# Patient Record
Sex: FEMALE | Race: WHITE | NOT HISPANIC OR LATINO | Employment: FULL TIME | URBAN - METROPOLITAN AREA
[De-identification: names, ages, dates, MRNs, and addresses within clinical notes are randomized per-mention and may not be internally consistent; named-entity substitution may affect disease eponyms.]

---

## 2021-04-27 ENCOUNTER — HOSPITAL ENCOUNTER (OUTPATIENT)
Dept: MRI IMAGING | Facility: HOSPITAL | Age: 46
Discharge: HOME | End: 2021-04-27
Attending: OBSTETRICS & GYNECOLOGY

## 2021-05-13 ENCOUNTER — HOSPITAL ENCOUNTER (OUTPATIENT)
Dept: ULTRASOUND IMAGING | Facility: HOSPITAL | Age: 46
Discharge: HOME | End: 2021-05-13
Attending: OBSTETRICS & GYNECOLOGY

## 2021-06-04 ENCOUNTER — HOSPITAL ENCOUNTER (OUTPATIENT)
Dept: ULTRASOUND IMAGING | Facility: HOSPITAL | Age: 46
Discharge: HOME | End: 2021-06-04
Attending: OBSTETRICS & GYNECOLOGY

## 2021-06-07 PROCEDURE — 88274 CYTOGENETICS 25-99: CPT | Performed by: PATHOLOGY

## 2021-06-07 PROCEDURE — 36415 COLL VENOUS BLD VENIPUNCTURE: CPT | Performed by: PATHOLOGY

## 2021-06-07 PROCEDURE — 88377 M/PHMTRC ALYS ISHQUANT/SEMIQ: CPT | Performed by: PATHOLOGY

## 2021-06-08 ENCOUNTER — TELEPHONE (OUTPATIENT)
Dept: MAMMOGRAPHY | Facility: HOSPITAL | Age: 46
End: 2021-06-08

## 2021-06-08 LAB — SPECIMEN STATUS: NORMAL

## 2021-06-08 NOTE — TELEPHONE ENCOUNTER
FROILAN CALLED TO INFORM ME THAT PT WILL BE SEEING DR. DARLENE FERNANDES IN [facility] FOR TREATMENT OF BREAST CANCER

## 2021-06-10 LAB
CELLS COUNTED: 40
CHROMOSOME 17 COPY NUMBER/NUCLEUS IN TISSUE BY FISH: 1.7
ERBB2 COPY NUM/CHR 17 COPY NUM TISS FISH: 1.19
ERBB2 GENE COPY NUMBER/NUCLEUS IN TISSUE BY FISH: 2
ERBB2 GENE DP TISS QL FISH: NORMAL
FIXATION TIME: NORMAL
FIXATIVE: NORMAL
LAB DIRECTOR NAME PROVIDER: NORMAL
NUMBER OF OBSERVERS:: 2
REF LAB TEST REF RANGE: NORMAL

## 2022-01-24 ENCOUNTER — OFFICE VISIT (OUTPATIENT)
Dept: OBSTETRICS AND GYNECOLOGY | Facility: CLINIC | Age: 47
End: 2022-01-24

## 2022-01-24 VITALS
HEART RATE: 81 BPM | HEIGHT: 63 IN | BODY MASS INDEX: 24.7 KG/M2 | SYSTOLIC BLOOD PRESSURE: 115 MMHG | DIASTOLIC BLOOD PRESSURE: 70 MMHG | WEIGHT: 139.4 LBS

## 2022-01-24 DIAGNOSIS — T45.1X5A HOT FLASHES DUE TO TAMOXIFEN: ICD-10-CM

## 2022-01-24 DIAGNOSIS — Z01.419 WELL WOMAN EXAM WITH ROUTINE GYNECOLOGICAL EXAM: Primary | ICD-10-CM

## 2022-01-24 DIAGNOSIS — R23.2 HOT FLASHES DUE TO TAMOXIFEN: ICD-10-CM

## 2022-01-24 DIAGNOSIS — Z12.11 ENCOUNTER FOR SCREENING COLONOSCOPY: ICD-10-CM

## 2022-01-24 DIAGNOSIS — Z85.3 HISTORY OF BREAST CANCER: ICD-10-CM

## 2022-01-24 DIAGNOSIS — N92.0 MENORRHAGIA WITH REGULAR CYCLE: ICD-10-CM

## 2022-01-24 DIAGNOSIS — N89.8 VAGINAL DRYNESS: ICD-10-CM

## 2022-01-24 PROCEDURE — 99214 OFFICE O/P EST MOD 30 MIN: CPT | Performed by: OBSTETRICS & GYNECOLOGY

## 2022-01-24 PROCEDURE — 99396 PREV VISIT EST AGE 40-64: CPT | Performed by: OBSTETRICS & GYNECOLOGY

## 2022-01-24 PROCEDURE — G0123 SCREEN CERV/VAG THIN LAYER: HCPCS | Performed by: OBSTETRICS & GYNECOLOGY

## 2022-01-24 RX ORDER — TAMOXIFEN CITRATE 20 MG/1
20 TABLET ORAL DAILY
COMMUNITY
Start: 2021-12-29 | End: 2022-03-29

## 2022-01-24 RX ORDER — ASPIRIN 81 MG/1
81 TABLET, CHEWABLE ORAL DAILY
COMMUNITY
End: 2022-03-29

## 2022-01-24 NOTE — PROGRESS NOTES
Well Woman Visit    Chief Complaint   Patient presents with   • Annual Exam     discuss hormone blocker, heavier cycles, painful intercourse           HPI  Shruthi Gabriel is a 46 y.o. female,  who presents for annual well woman exam.LMP 21 Pt is .   Pt is no GYN surgery  Menses q 28 days  x 3-4 days with 2 days heavy. On days you consider heavy use overnight pads or super + tampons and change  q1-2 hours. Do you ever bleed through pad or tampon in 2 hours or less? yew. Cramping with periods no..Patient is having symptoms of urinary incontinence No. Pt diagnosed with breast cancer hormone receptor positive last year.  Pt is currently on tamoxifen.  She is interested in management of the menorrhagia.    Additional OB/GYN History   Current contraception: contraceptive methods: Vasectomy   Desires to: continue contraception  Last Pap :   Last Completed Pap Smear          Ordered - PAP SMEAR (Every 3 Years) Ordered on 2021  SCANNED - PAP SMEAR              Result: Normal  History of abnormal Pap smear: no  Last MMG :   Last Completed Mammogram          MAMMOGRAM (Yearly) Next due on 2021  MRI Breast Bilateral With & Without Contrast    03/10/2021  SCANNED - MAMMO               Last Colonoscopy :   Last Completed Colonoscopy     This patient has no relevant Health Maintenance data.            AllergiesPatient has no known allergies.   Family history of uterine, colon, breast, or ovarian cancer: yes - sister and self:breast  Tobacco Usage?: No   OB History        1    Para   1    Term   1            AB        Living   1       SAB        IAB        Ectopic        Molar        Multiple        Live Births   1                The additional following portions of the patient's history were reviewed and updated as appropriate: allergies, current  "medications, past family history, past medical history, past social history, past surgical history and problem list.    Review of Systems   Constitutional: Negative.    HENT: Negative.    Eyes: Negative.    Respiratory: Negative.    Cardiovascular: Negative.    Gastrointestinal: Negative.    Endocrine: Negative.    Genitourinary: Negative.    Musculoskeletal: Negative.    Skin: Negative.    Allergic/Immunologic: Negative.    Neurological: Negative.    Hematological: Negative.    Psychiatric/Behavioral: Negative.        I have reviewed and agree with the HPI, ROS, and historical information as entered above. Magda Jean, DO    Objective   /70   Pulse 81   Ht 160 cm (63\")   Wt 63.2 kg (139 lb 6.4 oz)   LMP 12/27/2021   BMI 24.69 kg/m²     Physical Exam  Vitals and nursing note reviewed. Exam conducted with a chaperone present.   Constitutional:       Appearance: Normal appearance.   HENT:      Head: Normocephalic.   Neck:      Thyroid: No thyroid mass or thyromegaly.   Cardiovascular:      Rate and Rhythm: Regular rhythm.      Heart sounds: Normal heart sounds. No murmur heard.      Pulmonary:      Effort: Pulmonary effort is normal.      Breath sounds: Normal breath sounds. No wheezing.   Chest:   Breasts:      Right: Normal. No swelling, bleeding, inverted nipple, mass, nipple discharge, skin change, tenderness, axillary adenopathy or supraclavicular adenopathy.      Left: Normal. No swelling, bleeding, inverted nipple, mass, nipple discharge, skin change, tenderness, axillary adenopathy or supraclavicular adenopathy.       Abdominal:      General: There is no distension.      Palpations: Abdomen is soft. There is no mass.      Tenderness: There is no abdominal tenderness. There is no guarding or rebound.      Hernia: No hernia is present.   Genitourinary:     General: Normal vulva.      Labia:         Right: No lesion.         Left: No lesion.       Urethra: No urethral pain or urethral lesion.      " Vagina: Normal. No vaginal discharge, tenderness or prolapsed vaginal walls.      Cervix: Normal.      Uterus: Normal.       Adnexa: Right adnexa normal and left adnexa normal.      Rectum: Normal. Guaiac result negative.      Comments: Nml female external genitalia.  Cervix is parous. Uterus axial normal size shape and consistency.  No adnexal masses are appreciated    Musculoskeletal:      Cervical back: Normal range of motion and neck supple. No tenderness.   Lymphadenopathy:      Upper Body:      Right upper body: No supraclavicular or axillary adenopathy.      Left upper body: No supraclavicular or axillary adenopathy.   Skin:     General: Skin is warm and dry.   Neurological:      Mental Status: She is alert and oriented to person, place, and time.   Psychiatric:         Mood and Affect: Mood normal.         Behavior: Behavior normal.         Thought Content: Thought content normal.            Assessment and Plan    WWE    Diagnoses and all orders for this visit:    1. Well woman exam with routine gynecological exam (Primary)  -     IGP,rfx Aptima HPV All Pth    2. Menorrhagia with regular cycle  Overview:  Added automatically from request for surgery [id number]    Orders:  -     US Pelvis Transvaginal Non OB; Future  -     Case Request; Standing  -     COVID PRE-OP / PRE-PROCEDURE SCREENING ORDER (NO ISOLATION) - Swab, Nasopharynx; Future  -     Case Request    3. Encounter for screening colonoscopy  -     Ambulatory Referral to Gastroenterology    4. History of breast cancer   Patient reports breast cancer is estrogen and progesterone receptor positive.  She is therefore not a candidate for the use of hormonal manipulation to manage her menorrhagia.  Due to this I have recommended that we proceed with hysteroscopy D&C endometrial ablation to help her with her bleeding.  5.  Hot flashes due to tamoxifen   I discussed with the patient the possible use of Effexor for her hot flashes and I have recommended to the  patient she discussed this with her oncologist to see if he has any reservations with the use of this medication.  6.  Vaginal dryness I believe that the vaginal dryness and dyspareunia are also secondary to the use of her tamoxifen.  Since we are limited to medical treatment as we must avoid hormonal manipulation, I recommend pomegranate seed oil and have asked the patient to also speak to her oncologist about the use of Vagifem which has not been shown to have any systemic absorption.  If her oncologist is okay with the use of the Vagifem and/or Effexor she can let me know if he is uncomfortable prescribing these and I am happy to do that      Counselin. Breast Health- Clinical breast exam and mammogram reviewed specifically American Cancer Society recommendations for screening specifically to her, and self  breast awareness monthly  2. Pap updated today Yes  3. Smoking status - Non smoker  4. Colon health - screening  colonoscopy as per American Cancer Society recommendations.  5. Bone health - Weight bearing exercise, dietary calcium recommendations and vitamin D reviewed  6. Encouraged to be wary of information obtained via social media and internet based on source and search  7. Follow up prn and one year  8. Eat a diet that includes plenty of vegetables, fruits, low-fat dairy products, and lean protein  9. Do not eat a lot of foods that are high in solid fats, added sugars, or sodium  10. Maintain healthy weight  11. Get regular exercise: at least 150 minutes each week. The exercise should increase your heart rate and make you sweat (moderate-intensity exercise).  12. Do strengthening exercises at least 2x per week. This in addition to moderate-intensity exercise.  13. Spend less time sitting.  Even light physical activity can be beneficial.  14. Watch cholesterol and blood lipids     Preventative:  • MMG  • Colonoscopy          She understands the importance of having the above performed in a timely  fashion.  The risks of not performing them include, but are not limited to, advanced cancer stages, bone loss from osteoporosis and/or subsequent increase in morbidity and/or mortality.  She is encouraged to review her results online and/or contact or office if she has questions.     Follow Up:  No follow-ups on file.        Magda Jean,   01/24/2022

## 2022-01-24 NOTE — PROGRESS NOTES
Well Woman Visit    CC: Scheduled annual well gyn visit  Chief Complaint   Patient presents with   • Annual Exam       Myriad intake in the past?: Yes    QUALIFIED TODAY    Contraception or HRT: None

## 2022-01-26 LAB
CONV .: NORMAL
CYTOLOGIST CVX/VAG CYTO: NORMAL
CYTOLOGY CVX/VAG DOC CYTO: NORMAL
CYTOLOGY CVX/VAG DOC THIN PREP: NORMAL
DX ICD CODE: NORMAL
HIV 1 & 2 AB SER-IMP: NORMAL
OTHER STN SPEC: NORMAL
STAT OF ADQ CVX/VAG CYTO-IMP: NORMAL

## 2022-02-17 ENCOUNTER — TELEPHONE (OUTPATIENT)
Dept: GASTROENTEROLOGY | Facility: CLINIC | Age: 47
End: 2022-02-17

## 2022-02-17 ENCOUNTER — PREP FOR SURGERY (OUTPATIENT)
Dept: OTHER | Facility: HOSPITAL | Age: 47
End: 2022-02-17

## 2022-02-17 ENCOUNTER — CLINICAL SUPPORT (OUTPATIENT)
Dept: GASTROENTEROLOGY | Facility: CLINIC | Age: 47
End: 2022-02-17

## 2022-02-17 ENCOUNTER — LAB (OUTPATIENT)
Dept: LAB | Facility: HOSPITAL | Age: 47
End: 2022-02-17

## 2022-02-17 DIAGNOSIS — Z12.11 ENCOUNTER FOR SCREENING FOR MALIGNANT NEOPLASM OF COLON: Primary | ICD-10-CM

## 2022-02-17 DIAGNOSIS — N92.0 MENORRHAGIA WITH REGULAR CYCLE: ICD-10-CM

## 2022-02-17 LAB — SARS-COV-2 RNA PNL SPEC NAA+PROBE: NOT DETECTED

## 2022-02-17 PROCEDURE — C9803 HOPD COVID-19 SPEC COLLECT: HCPCS

## 2022-02-17 PROCEDURE — U0004 COV-19 TEST NON-CDC HGH THRU: HCPCS

## 2022-02-17 RX ORDER — SOD SULF/POT CHLORIDE/MAG SULF 1.479 G
12 TABLET ORAL TAKE AS DIRECTED
Qty: 24 TABLET | Refills: 0 | Status: SHIPPED | OUTPATIENT
Start: 2022-02-17 | End: 2022-04-01

## 2022-02-17 NOTE — TELEPHONE ENCOUNTER
Shruthi Gabriel  REASON FOR CALL Colon Screening  SENT IN PREP [facility]  Past Medical History:   Diagnosis Date   • Breast cancer (HCC)    • Menorrhagia    • Ulcerative colitis (HCC)      No Known Allergies  Past Surgical History:   Procedure Laterality Date   • BREAST LUMPECTOMY     •  SECTION     • COLONOSCOPY       Social History     Socioeconomic History   • Marital status:    Tobacco Use   • Smoking status: Never Smoker   • Smokeless tobacco: Never Used   Vaping Use   • Vaping Use: Never used   Substance and Sexual Activity   • Alcohol use: Not Currently   • Drug use: Never   • Sexual activity: Not Currently     Birth control/protection: Partner's vasectomy     Family History   Problem Relation Age of Onset   • Breast cancer Sister    • Colon cancer Neg Hx        Current Outpatient Medications:   •  aspirin 81 MG chewable tablet, Chew 81 mg Daily. Per pt was instructed to stop 2 weeks prior to procedure, Disp: , Rfl:   •  tamoxifen (NOLVADEX) 20 MG chemo tablet, Take 20 mg by mouth Daily., Disp: , Rfl:

## 2022-02-20 PROCEDURE — S0260 H&P FOR SURGERY: HCPCS | Performed by: OBSTETRICS & GYNECOLOGY

## 2022-02-20 RX ORDER — CEFAZOLIN SODIUM 2 G/100ML
2 INJECTION, SOLUTION INTRAVENOUS ONCE
Status: COMPLETED | OUTPATIENT
Start: 2022-02-20 | End: 2022-02-22

## 2022-02-21 PROBLEM — Z12.11 ENCOUNTER FOR SCREENING FOR MALIGNANT NEOPLASM OF COLON: Status: ACTIVE | Noted: 2022-02-21

## 2022-02-22 ENCOUNTER — ANESTHESIA (OUTPATIENT)
Dept: PERIOP | Facility: HOSPITAL | Age: 47
End: 2022-02-22

## 2022-02-22 ENCOUNTER — ANESTHESIA EVENT (OUTPATIENT)
Dept: PERIOP | Facility: HOSPITAL | Age: 47
End: 2022-02-22

## 2022-02-22 ENCOUNTER — HOSPITAL ENCOUNTER (OUTPATIENT)
Facility: HOSPITAL | Age: 47
Setting detail: HOSPITAL OUTPATIENT SURGERY
Discharge: HOME | End: 2022-02-22
Attending: OBSTETRICS & GYNECOLOGY | Admitting: OBSTETRICS & GYNECOLOGY

## 2022-02-22 VITALS
HEIGHT: 63 IN | SYSTOLIC BLOOD PRESSURE: 122 MMHG | RESPIRATION RATE: 16 BRPM | HEART RATE: 67 BPM | OXYGEN SATURATION: 100 % | WEIGHT: 137.35 LBS | BODY MASS INDEX: 24.34 KG/M2 | DIASTOLIC BLOOD PRESSURE: 69 MMHG | TEMPERATURE: 96.6 F

## 2022-02-22 DIAGNOSIS — N92.0 MENORRHAGIA WITH REGULAR CYCLE: ICD-10-CM

## 2022-02-22 PROBLEM — Z85.3 HISTORY OF BREAST CANCER: Status: ACTIVE | Noted: 2022-02-22

## 2022-02-22 LAB
BASOPHILS # BLD AUTO: 0.04 10*3/MM3 (ref 0–0.2)
BASOPHILS NFR BLD AUTO: 0.7 % (ref 0–1.5)
DEPRECATED RDW RBC AUTO: 44.5 FL (ref 37–54)
EOSINOPHIL # BLD AUTO: 0.14 10*3/MM3 (ref 0–0.4)
EOSINOPHIL NFR BLD AUTO: 2.5 % (ref 0.3–6.2)
ERYTHROCYTE [DISTWIDTH] IN BLOOD BY AUTOMATED COUNT: 14.2 % (ref 12.3–15.4)
HCG SERPL QL: NEGATIVE
HCT VFR BLD AUTO: 35.4 % (ref 34–46.6)
HGB BLD-MCNC: 10.9 G/DL (ref 12–15.9)
IMM GRANULOCYTES # BLD AUTO: 0.02 10*3/MM3 (ref 0–0.05)
IMM GRANULOCYTES NFR BLD AUTO: 0.4 % (ref 0–0.5)
LYMPHOCYTES # BLD AUTO: 0.96 10*3/MM3 (ref 0.7–3.1)
LYMPHOCYTES NFR BLD AUTO: 17.1 % (ref 19.6–45.3)
MCH RBC QN AUTO: 27 PG (ref 26.6–33)
MCHC RBC AUTO-ENTMCNC: 30.8 G/DL (ref 31.5–35.7)
MCV RBC AUTO: 87.6 FL (ref 79–97)
MONOCYTES # BLD AUTO: 0.44 10*3/MM3 (ref 0.1–0.9)
MONOCYTES NFR BLD AUTO: 7.9 % (ref 5–12)
NEUTROPHILS NFR BLD AUTO: 4 10*3/MM3 (ref 1.7–7)
NEUTROPHILS NFR BLD AUTO: 71.4 % (ref 42.7–76)
NRBC BLD AUTO-RTO: 0 /100 WBC (ref 0–0.2)
PLATELET # BLD AUTO: 266 10*3/MM3 (ref 140–450)
PMV BLD AUTO: 10.5 FL (ref 6–12)
RBC # BLD AUTO: 4.04 10*6/MM3 (ref 3.77–5.28)
WBC NRBC COR # BLD: 5.6 10*3/MM3 (ref 3.4–10.8)

## 2022-02-22 PROCEDURE — 25010000002 MIDAZOLAM PER 1 MG: Performed by: ANESTHESIOLOGY

## 2022-02-22 PROCEDURE — 85025 COMPLETE CBC W/AUTO DIFF WBC: CPT | Performed by: OBSTETRICS & GYNECOLOGY

## 2022-02-22 PROCEDURE — 25010000002 ONDANSETRON PER 1 MG: Performed by: NURSE ANESTHETIST, CERTIFIED REGISTERED

## 2022-02-22 PROCEDURE — 25010000002 PROPOFOL 10 MG/ML EMULSION: Performed by: NURSE ANESTHETIST, CERTIFIED REGISTERED

## 2022-02-22 PROCEDURE — 0 CEFAZOLIN IN DEXTROSE 2-4 GM/100ML-% SOLUTION: Performed by: OBSTETRICS & GYNECOLOGY

## 2022-02-22 PROCEDURE — 88305 TISSUE EXAM BY PATHOLOGIST: CPT | Performed by: OBSTETRICS & GYNECOLOGY

## 2022-02-22 PROCEDURE — 84703 CHORIONIC GONADOTROPIN ASSAY: CPT | Performed by: OBSTETRICS & GYNECOLOGY

## 2022-02-22 PROCEDURE — 58563 HYSTEROSCOPY ABLATION: CPT | Performed by: OBSTETRICS & GYNECOLOGY

## 2022-02-22 PROCEDURE — 25010000002 FENTANYL CITRATE (PF) 50 MCG/ML SOLUTION: Performed by: NURSE ANESTHETIST, CERTIFIED REGISTERED

## 2022-02-22 PROCEDURE — 0 LIDOCAINE 1 % SOLUTION: Performed by: OBSTETRICS & GYNECOLOGY

## 2022-02-22 PROCEDURE — 25010000002 KETOROLAC TROMETHAMINE PER 15 MG: Performed by: NURSE ANESTHETIST, CERTIFIED REGISTERED

## 2022-02-22 RX ORDER — MEPERIDINE HYDROCHLORIDE 25 MG/ML
12.5 INJECTION INTRAMUSCULAR; INTRAVENOUS; SUBCUTANEOUS
Status: DISCONTINUED | OUTPATIENT
Start: 2022-02-22 | End: 2022-02-22 | Stop reason: HOSPADM

## 2022-02-22 RX ORDER — SODIUM CHLORIDE, SODIUM LACTATE, POTASSIUM CHLORIDE, CALCIUM CHLORIDE 600; 310; 30; 20 MG/100ML; MG/100ML; MG/100ML; MG/100ML
9 INJECTION, SOLUTION INTRAVENOUS CONTINUOUS PRN
Status: DISCONTINUED | OUTPATIENT
Start: 2022-02-22 | End: 2022-02-22 | Stop reason: HOSPADM

## 2022-02-22 RX ORDER — FENTANYL CITRATE 50 UG/ML
INJECTION, SOLUTION INTRAMUSCULAR; INTRAVENOUS AS NEEDED
Status: DISCONTINUED | OUTPATIENT
Start: 2022-02-22 | End: 2022-02-22 | Stop reason: SURG

## 2022-02-22 RX ORDER — KETOROLAC TROMETHAMINE 30 MG/ML
INJECTION, SOLUTION INTRAMUSCULAR; INTRAVENOUS AS NEEDED
Status: DISCONTINUED | OUTPATIENT
Start: 2022-02-22 | End: 2022-02-22 | Stop reason: SURG

## 2022-02-22 RX ORDER — ACETAMINOPHEN 500 MG
1000 TABLET ORAL ONCE
Status: COMPLETED | OUTPATIENT
Start: 2022-02-22 | End: 2022-02-22

## 2022-02-22 RX ORDER — OXYCODONE HYDROCHLORIDE 5 MG/1
5 TABLET ORAL
Status: DISCONTINUED | OUTPATIENT
Start: 2022-02-22 | End: 2022-02-22 | Stop reason: HOSPADM

## 2022-02-22 RX ORDER — IBUPROFEN 600 MG/1
600 TABLET ORAL EVERY 6 HOURS PRN
Status: DISCONTINUED | OUTPATIENT
Start: 2022-02-22 | End: 2022-02-22 | Stop reason: HOSPADM

## 2022-02-22 RX ORDER — ONDANSETRON 4 MG/1
4 TABLET, FILM COATED ORAL ONCE AS NEEDED
Status: DISCONTINUED | OUTPATIENT
Start: 2022-02-22 | End: 2022-02-22 | Stop reason: HOSPADM

## 2022-02-22 RX ORDER — ONDANSETRON 2 MG/ML
INJECTION INTRAMUSCULAR; INTRAVENOUS AS NEEDED
Status: DISCONTINUED | OUTPATIENT
Start: 2022-02-22 | End: 2022-02-22 | Stop reason: SURG

## 2022-02-22 RX ORDER — ONDANSETRON 2 MG/ML
4 INJECTION INTRAMUSCULAR; INTRAVENOUS ONCE AS NEEDED
Status: DISCONTINUED | OUTPATIENT
Start: 2022-02-22 | End: 2022-02-22 | Stop reason: HOSPADM

## 2022-02-22 RX ORDER — MIDAZOLAM HYDROCHLORIDE 1 MG/ML
2 INJECTION INTRAMUSCULAR; INTRAVENOUS ONCE
Status: COMPLETED | OUTPATIENT
Start: 2022-02-22 | End: 2022-02-22

## 2022-02-22 RX ORDER — IBUPROFEN 600 MG/1
600 TABLET ORAL EVERY 6 HOURS PRN
Qty: 30 TABLET | Refills: 1 | Status: SHIPPED | OUTPATIENT
Start: 2022-02-22 | End: 2022-04-01

## 2022-02-22 RX ORDER — LIDOCAINE HYDROCHLORIDE 20 MG/ML
INJECTION, SOLUTION INFILTRATION; PERINEURAL AS NEEDED
Status: DISCONTINUED | OUTPATIENT
Start: 2022-02-22 | End: 2022-02-22 | Stop reason: SURG

## 2022-02-22 RX ORDER — PROMETHAZINE HYDROCHLORIDE 25 MG/1
25 SUPPOSITORY RECTAL ONCE AS NEEDED
Status: DISCONTINUED | OUTPATIENT
Start: 2022-02-22 | End: 2022-02-22 | Stop reason: HOSPADM

## 2022-02-22 RX ORDER — PROMETHAZINE HYDROCHLORIDE 12.5 MG/1
25 TABLET ORAL ONCE AS NEEDED
Status: DISCONTINUED | OUTPATIENT
Start: 2022-02-22 | End: 2022-02-22 | Stop reason: HOSPADM

## 2022-02-22 RX ORDER — HYDROCODONE BITARTRATE AND ACETAMINOPHEN 5; 325 MG/1; MG/1
1-2 TABLET ORAL EVERY 4 HOURS PRN
Qty: 12 TABLET | Refills: 0 | Status: SHIPPED | OUTPATIENT
Start: 2022-02-22 | End: 2022-04-01

## 2022-02-22 RX ORDER — PROPOFOL 10 MG/ML
VIAL (ML) INTRAVENOUS AS NEEDED
Status: DISCONTINUED | OUTPATIENT
Start: 2022-02-22 | End: 2022-02-22 | Stop reason: SURG

## 2022-02-22 RX ORDER — MAGNESIUM HYDROXIDE 1200 MG/15ML
LIQUID ORAL AS NEEDED
Status: DISCONTINUED | OUTPATIENT
Start: 2022-02-22 | End: 2022-02-22 | Stop reason: HOSPADM

## 2022-02-22 RX ORDER — LIDOCAINE HYDROCHLORIDE 10 MG/ML
INJECTION, SOLUTION INFILTRATION; PERINEURAL AS NEEDED
Status: DISCONTINUED | OUTPATIENT
Start: 2022-02-22 | End: 2022-02-22 | Stop reason: HOSPADM

## 2022-02-22 RX ADMIN — ACETAMINOPHEN 1000 MG: 500 TABLET ORAL at 10:19

## 2022-02-22 RX ADMIN — PROPOFOL 220 MCG/KG/MIN: 10 INJECTION, EMULSION INTRAVENOUS at 12:20

## 2022-02-22 RX ADMIN — FENTANYL CITRATE 25 MCG: 50 INJECTION, SOLUTION INTRAMUSCULAR; INTRAVENOUS at 12:35

## 2022-02-22 RX ADMIN — FENTANYL CITRATE 50 MCG: 50 INJECTION, SOLUTION INTRAMUSCULAR; INTRAVENOUS at 12:18

## 2022-02-22 RX ADMIN — CEFAZOLIN SODIUM 2 G: 2 INJECTION, SOLUTION INTRAVENOUS at 12:16

## 2022-02-22 RX ADMIN — KETOROLAC TROMETHAMINE 30 MG: 30 INJECTION, SOLUTION INTRAMUSCULAR; INTRAVENOUS at 12:33

## 2022-02-22 RX ADMIN — MIDAZOLAM 2 MG: 1 INJECTION INTRAMUSCULAR; INTRAVENOUS at 11:53

## 2022-02-22 RX ADMIN — LIDOCAINE HYDROCHLORIDE 60 MG: 20 INJECTION, SOLUTION INFILTRATION; PERINEURAL at 12:18

## 2022-02-22 RX ADMIN — FENTANYL CITRATE 25 MCG: 50 INJECTION, SOLUTION INTRAMUSCULAR; INTRAVENOUS at 12:28

## 2022-02-22 RX ADMIN — ONDANSETRON 4 MG: 2 INJECTION INTRAMUSCULAR; INTRAVENOUS at 12:33

## 2022-02-22 RX ADMIN — SODIUM CHLORIDE, POTASSIUM CHLORIDE, SODIUM LACTATE AND CALCIUM CHLORIDE 9 ML/HR: 600; 310; 30; 20 INJECTION, SOLUTION INTRAVENOUS at 10:19

## 2022-02-22 RX ADMIN — PROPOFOL 70 MG: 10 INJECTION, EMULSION INTRAVENOUS at 12:18

## 2022-02-22 RX ADMIN — SODIUM CHLORIDE, POTASSIUM CHLORIDE, SODIUM LACTATE AND CALCIUM CHLORIDE 9 ML/HR: 600; 310; 30; 20 INJECTION, SOLUTION INTRAVENOUS at 13:38

## 2022-02-22 NOTE — ANESTHESIA POSTPROCEDURE EVALUATION
Patient: Shruthi Gabriel    Procedure Summary     Date: 02/22/22 Room / Location: [facility] OR 04 / [facility] MAIN OR    Anesthesia Start: 1216 Anesthesia Stop: 1304    Procedure: DILATATION AND CURETTAGE HYSTEROSCOPY NOVASURE ENDOMETRIAL ABLATION (N/A Vagina) Diagnosis:       Menorrhagia with regular cycle      (Menorrhagia with regular cycle [N92.0])    Surgeons: Magda Jean DO Provider: Emerson Bunch MD    Anesthesia Type: general ASA Status: 1          Anesthesia Type: general    Vitals  Vitals Value Taken Time   /66 02/22/22 1322   Temp 36.2 °C (97.2 °F) 02/22/22 1303   Pulse 52 02/22/22 1324   Resp 12 02/22/22 1310   SpO2 100 % 02/22/22 1324   Vitals shown include unvalidated device data.        Post Anesthesia Care and Evaluation    Patient location during evaluation: bedside  Patient participation: complete - patient participated  Level of consciousness: awake  Pain management: adequate  Airway patency: patent  Anesthetic complications: No anesthetic complications  PONV Status: none  Cardiovascular status: acceptable and stable  Respiratory status: acceptable and room air  Hydration status: acceptable    Comments: An Anesthesiologist personally participated in the most demanding procedures (including induction and emergence if applicable) in the anesthesia plan, monitored the course of anesthesia administration at frequent intervals and remained physically present and available for immediate diagnosis and treatment of emergencies.

## 2022-02-23 LAB
CYTO UR: NORMAL
LAB AP CASE REPORT: NORMAL
LAB AP CLINICAL INFORMATION: NORMAL
PATH REPORT.FINAL DX SPEC: NORMAL
PATH REPORT.GROSS SPEC: NORMAL

## 2022-03-29 RX ORDER — FERROUS SULFATE 325(65) MG
325 TABLET ORAL EVERY OTHER DAY
COMMUNITY

## 2022-03-31 ENCOUNTER — ANESTHESIA (OUTPATIENT)
Dept: GASTROENTEROLOGY | Facility: HOSPITAL | Age: 47
End: 2022-03-31

## 2022-03-31 ENCOUNTER — HOSPITAL ENCOUNTER (OUTPATIENT)
Facility: HOSPITAL | Age: 47
Setting detail: HOSPITAL OUTPATIENT SURGERY
Discharge: HOME | End: 2022-03-31
Attending: INTERNAL MEDICINE | Admitting: INTERNAL MEDICINE

## 2022-03-31 ENCOUNTER — ANESTHESIA EVENT (OUTPATIENT)
Dept: GASTROENTEROLOGY | Facility: HOSPITAL | Age: 47
End: 2022-03-31

## 2022-03-31 VITALS
OXYGEN SATURATION: 100 % | DIASTOLIC BLOOD PRESSURE: 58 MMHG | WEIGHT: 134.48 LBS | RESPIRATION RATE: 18 BRPM | HEIGHT: 63 IN | BODY MASS INDEX: 23.83 KG/M2 | SYSTOLIC BLOOD PRESSURE: 113 MMHG | HEART RATE: 74 BPM | TEMPERATURE: 99.2 F

## 2022-03-31 DIAGNOSIS — Z12.11 ENCOUNTER FOR SCREENING FOR MALIGNANT NEOPLASM OF COLON: ICD-10-CM

## 2022-03-31 LAB — B-HCG UR QL: NEGATIVE

## 2022-03-31 PROCEDURE — 81025 URINE PREGNANCY TEST: CPT | Performed by: INTERNAL MEDICINE

## 2022-03-31 PROCEDURE — 45399 UNLISTED PROCEDURE COLON: CPT | Performed by: INTERNAL MEDICINE

## 2022-03-31 PROCEDURE — 25010000002 PROPOFOL 10 MG/ML EMULSION: Performed by: NURSE ANESTHETIST, CERTIFIED REGISTERED

## 2022-03-31 PROCEDURE — 45385 COLONOSCOPY W/LESION REMOVAL: CPT | Performed by: INTERNAL MEDICINE

## 2022-03-31 PROCEDURE — 45380 COLONOSCOPY AND BIOPSY: CPT | Performed by: INTERNAL MEDICINE

## 2022-03-31 PROCEDURE — 88305 TISSUE EXAM BY PATHOLOGIST: CPT | Performed by: INTERNAL MEDICINE

## 2022-03-31 RX ORDER — PHENYLEPHRINE HCL IN 0.9% NACL 1 MG/10 ML
SYRINGE (ML) INTRAVENOUS AS NEEDED
Status: DISCONTINUED | OUTPATIENT
Start: 2022-03-31 | End: 2022-03-31 | Stop reason: SURG

## 2022-03-31 RX ORDER — LIDOCAINE HYDROCHLORIDE 20 MG/ML
INJECTION, SOLUTION INFILTRATION; PERINEURAL AS NEEDED
Status: DISCONTINUED | OUTPATIENT
Start: 2022-03-31 | End: 2022-03-31 | Stop reason: SURG

## 2022-03-31 RX ORDER — SODIUM CHLORIDE, SODIUM LACTATE, POTASSIUM CHLORIDE, CALCIUM CHLORIDE 600; 310; 30; 20 MG/100ML; MG/100ML; MG/100ML; MG/100ML
30 INJECTION, SOLUTION INTRAVENOUS CONTINUOUS
Status: DISCONTINUED | OUTPATIENT
Start: 2022-03-31 | End: 2022-03-31 | Stop reason: HOSPADM

## 2022-03-31 RX ORDER — PROPOFOL 10 MG/ML
VIAL (ML) INTRAVENOUS AS NEEDED
Status: DISCONTINUED | OUTPATIENT
Start: 2022-03-31 | End: 2022-03-31 | Stop reason: SURG

## 2022-03-31 RX ORDER — GLYCOPYRROLATE 0.2 MG/ML
INJECTION INTRAMUSCULAR; INTRAVENOUS AS NEEDED
Status: DISCONTINUED | OUTPATIENT
Start: 2022-03-31 | End: 2022-03-31 | Stop reason: SURG

## 2022-03-31 RX ADMIN — PROPOFOL 100 MG: 10 INJECTION, EMULSION INTRAVENOUS at 10:23

## 2022-03-31 RX ADMIN — SODIUM CHLORIDE, POTASSIUM CHLORIDE, SODIUM LACTATE AND CALCIUM CHLORIDE: 600; 310; 30; 20 INJECTION, SOLUTION INTRAVENOUS at 10:21

## 2022-03-31 RX ADMIN — GLYCOPYRROLATE 0.2 MCG: 0.2 INJECTION INTRAMUSCULAR; INTRAVENOUS at 10:39

## 2022-03-31 RX ADMIN — LIDOCAINE HYDROCHLORIDE 100 MG: 20 INJECTION, SOLUTION INFILTRATION; PERINEURAL at 10:23

## 2022-03-31 RX ADMIN — PROPOFOL 200 MCG/KG/MIN: 10 INJECTION, EMULSION INTRAVENOUS at 10:23

## 2022-03-31 RX ADMIN — Medication 100 MCG: at 10:36

## 2022-03-31 NOTE — ANESTHESIA PREPROCEDURE EVALUATION
Anesthesia Evaluation     Patient summary reviewed and Nursing notes reviewed   no history of anesthetic complications:  NPO Solid Status: > 8 hours  NPO Liquid Status: > 2 hours           Airway   Mallampati: II  TM distance: >3 FB  Neck ROM: full  No difficulty expected  Dental      Pulmonary - negative pulmonary ROS and normal exam    breath sounds clear to auscultation  Cardiovascular - negative cardio ROS and normal exam  Exercise tolerance: good (4-7 METS)    Rhythm: regular  Rate: normal        Neuro/Psych- negative ROS  GI/Hepatic/Renal/Endo - negative ROS     ROS Comment: UC    Musculoskeletal     Abdominal    Substance History      OB/GYN          Other      history of cancer remission                    Anesthesia Plan    ASA 1     general   total IV anesthesia    Anesthetic plan, all risks, benefits, and alternatives have been provided, discussed and informed consent has been obtained with: patient.        CODE STATUS:

## 2022-03-31 NOTE — ANESTHESIA POSTPROCEDURE EVALUATION
Patient: Shruthi Gabriel    Procedure Summary     Date: 03/31/22 Room / Location: [facility] ENDOSCOPY 4 / [facility]OSCOPY    Anesthesia Start: 1021 Anesthesia Stop: 1049    Procedure: COLONOSCOPY SCREENING WITH INDIGO CARMINE FLUSH, POLYPECTOMY (N/A ) Diagnosis:       Encounter for screening for malignant neoplasm of colon      (Encounter for screening for malignant neoplasm of colon [Z12.11])    Surgeons: Iggy Hoskins MD Provider: Marcus Kohli MD    Anesthesia Type: general ASA Status: 1          Anesthesia Type: general    Vitals  Vitals Value Taken Time   /58 03/31/22 1114   Temp 35.8 °C (96.4 °F) 03/31/22 1054   Pulse 73 03/31/22 1114   Resp 18 03/31/22 1110   SpO2 100 % 03/31/22 1114   Vitals shown include unvalidated device data.        Post Anesthesia Care and Evaluation    Patient location during evaluation: bedside  Patient participation: complete - patient participated  Level of consciousness: awake  Pain management: adequate  Airway patency: patent  Anesthetic complications: No anesthetic complications  PONV Status: none  Cardiovascular status: acceptable and stable  Respiratory status: acceptable  Hydration status: acceptable    Comments: An Anesthesiologist personally participated in the most demanding procedures (including induction and emergence if applicable) in the anesthesia plan, monitored the course of anesthesia administration at frequent intervals and remained physically present and available for immediate diagnosis and treatment of emergencies.

## 2022-04-01 ENCOUNTER — OFFICE VISIT (OUTPATIENT)
Dept: OBSTETRICS AND GYNECOLOGY | Facility: CLINIC | Age: 47
End: 2022-04-01

## 2022-04-01 VITALS
SYSTOLIC BLOOD PRESSURE: 120 MMHG | BODY MASS INDEX: 23.71 KG/M2 | WEIGHT: 134 LBS | DIASTOLIC BLOOD PRESSURE: 47 MMHG | HEART RATE: 62 BPM

## 2022-04-01 DIAGNOSIS — Z98.890 HISTORY OF ENDOMETRIAL ABLATION: Primary | ICD-10-CM

## 2022-04-01 PROCEDURE — 99212 OFFICE O/P EST SF 10 MIN: CPT | Performed by: OBSTETRICS & GYNECOLOGY

## 2022-04-01 NOTE — PROGRESS NOTES
Post Operative Visit      CC: Post operative follow up  Chief Complaint   Patient presents with   • Post-op     DILATATION AND CURETTAGE HYSTEROSCOPY NOVASURE ENDOMETRIAL ABLATION       HPI:   Pain:  No  Vaginal bleeding:  No  Vaginal discharge:  No  Fever/chills:  No  Good appetite:  Yes  Normal bladder function:  Yes  Normal bowel function:  Yes  Hot flashes: No

## 2022-04-01 NOTE — PROGRESS NOTES
Post Operative Visit      CC: Post operative follow up    HPI:   Pain:  No  Vaginal bleeding:  No  Vaginal discharge:  No  Fever/chills:  No  Good appetite:  Yes  Normal bladder function:  Yes  Normal bowel function:  Yes  Hot flashes: No    Operative report, surgical findings and any pathology reviewed.    /47   Pulse 62   Wt 60.8 kg (134 lb)   BMI 23.71 kg/m²     Physical Exam  Vitals and nursing note reviewed. Exam conducted with a chaperone present.   Constitutional:       Appearance: Normal appearance.   HENT:      Head: Normocephalic.   Abdominal:      Palpations: Abdomen is soft.   Genitourinary:     Comments: Nml female external genitalia.  Cervix is parous. Uterus axial normal size shape and consistency.  No adnexal masses are appreciated    Musculoskeletal:         General: Normal range of motion.      Cervical back: Normal range of motion.   Skin:     General: Skin is warm and dry.   Neurological:      General: No focal deficit present.      Mental Status: She is alert and oriented to person, place, and time.   Psychiatric:         Mood and Affect: Mood normal.         Behavior: Behavior normal.       Clinical Information    Comment:    Menorrhagia with regular cycle      Final Diagnosis   Endometrium, currettage:               - Disordered proliferative endometrium           ASSESSMENT:  Post-operative exam    Diagnoses and all orders for this visit:    1. History of endometrial ablation (Primary)          PLAN:    Any available photos and/or pathology were reviewed.  All questions answered.     Ok to resume normal activities    Counseling:         Follow Up:  No follow-ups on file.            Magda Jean DO  04/01/2022

## 2022-04-06 ENCOUNTER — TELEPHONE (OUTPATIENT)
Dept: GASTROENTEROLOGY | Facility: CLINIC | Age: 47
End: 2022-04-06

## 2022-04-06 NOTE — TELEPHONE ENCOUNTER
The patient has been added to the 2 year colon recall list and a results message was sent to the pt via [employer].

## 2022-04-25 ENCOUNTER — OFFICE VISIT (OUTPATIENT)
Dept: CARDIOLOGY | Facility: CLINIC | Age: 47
End: 2022-04-25

## 2022-04-25 VITALS — HEART RATE: 63 BPM | SYSTOLIC BLOOD PRESSURE: 122 MMHG | DIASTOLIC BLOOD PRESSURE: 49 MMHG

## 2022-04-25 DIAGNOSIS — R00.2 PALPITATIONS: Primary | ICD-10-CM

## 2022-04-25 PROCEDURE — 99202 OFFICE O/P NEW SF 15 MIN: CPT | Performed by: SPECIALIST

## 2022-04-25 RX ORDER — ASPIRIN 81 MG/1
81 TABLET ORAL DAILY
COMMUNITY

## 2022-04-25 RX ORDER — TAMOXIFEN CITRATE 10 MG/1
10 TABLET ORAL DAILY
COMMUNITY
Start: 2022-03-30

## 2022-04-25 NOTE — PROGRESS NOTES
[facility]   Cardiology Consult Note    Patient Name: Shruthi Gabriel  : 1975  Referring Physician: No ref. provider found  Subjective   Subjective     Reason for Consult/ Chief Complaint:   Chief Complaint   Patient presents with   • Palpitations       HPI:  Shruthi Gabriel is a 47 y.o. female with history of breast cancer status postchemotherapy.  Has had palpitations on and off for 2 years.  Apparently increased since she started tamoxifen.  No chest pain or shortness of breath.  No syncopal or presyncopal episode.    Review of Systems:   Constitutional no fever,  no weight loss   Skin no rash   Otolaryngeal no difficulty swallowing   Cardiovascular See HPI   Pulmonary no cough, no sputum production   Gastrointestinal no constipation, no diarrhea   Genitourinary no dysuria, no hematuria   Hematologic no easy bruisability, no abnormal bleeding   Musculoskeletal no muscle pain   Neurologic no dizziness, no falls     Personal History     Past Medical History:  Past Medical History:   Diagnosis Date   • Breast cancer (HCC)    • Menorrhagia    • Ulcerative colitis (HCC)        Family History:   Family History   Problem Relation Age of Onset   • Heart failure Mother    • Breast cancer Sister    • Heart failure Maternal Grandfather    • Colon cancer Neg Hx    • Malig Hyperthermia Neg Hx        Social History:  reports that she has never smoked. She has never used smokeless tobacco. She reports previous alcohol use. She reports that she does not use drugs.    Home Medications:  Probiotic Product, aspirin, ferrous sulfate, and tamoxifen    Allergies:  No Known Allergies    Objective    Objective     Vitals:   Heart Rate:  [63] 63  BP: (122)/(49) 122/49  There is no height or weight on file to calculate BMI.  Physical Exam:   Constitutional: Awake, alert, No acute distress    Eyes: PERRLA, sclerae anicteric, no conjunctival injection   HENT: NCAT, mucous membranes moist   Neck: Supple, no thyromegaly, no  lymphadenopathy, trachea midline   Respiratory: Clear to auscultation bilaterally, nonlabored respirations    Cardiovascular: RRR, no murmurs or rubs. Palpable pedal pulses bilaterally   Gastrointestinal: Positive bowel sounds, soft, nontender, nondistended   Musculoskeletal: No bilateral ankle edema, no clubbing or cyanosis to extremities   Psychiatric: Appropriate affect, cooperative   Neurologic: Oriented x 3, strength symmetric in all extremities, Cranial Nerves grossly intact to confrontation, speech clear   Skin: No rashes     Result Review    Result Review:  I have personally reviewed the available results:  [x]  Laboratory  [x]  EKG/Telemetry   [x]  Cardiology/Vascular   [x] Medications  [x]  Old records  No results found for: CHOL  No results found for: TRIG  No results found for: HDL  No results found for: LDL  No results found for: VLDL   @RESUFAST(ALT:3)      Procedures     Impression/Plan  1.  Palpitations: 48-hour Holter monitoring.  Echocardiogram to evaluate left ventricular systolic function and any mitral valve prolapse.  Low-dose beta-blockers if she has significant arrhythmias.  Low caffeine diet advised.        Electronically signed by Rene Dunham MD, 04/25/22, 1:42 PM EDT.

## 2022-06-19 NOTE — PROGRESS NOTES
[facility]  Cardiology progress Note    Patient Name: Shruthi Gabriel  : 1975    CHIEF COMPLAINT  Palpitations      Subjective   Subjective     HISTORY OF PRESENT ILLNESS    Shruthi Gabriel is a 47 y.o. female with history of palpitations.  Palpitations improved.  No chest pain.  No shortness of breath.    Review of Systems:   Constitutional no fever,  no weight loss   Skin no rash   Otolaryngeal no difficulty swallowing   Cardiovascular See HPI   Pulmonary no cough, no sputum production   Gastrointestinal no constipation, no diarrhea   Genitourinary no dysuria, no hematuria   Hematologic no easy bruisability, no abnormal bleeding   Musculoskeletal no muscle pain   Neurologic no dizziness, no falls         Personal History     Social History:  reports that she has never smoked. She has never used smokeless tobacco. She reports previous alcohol use. She reports that she does not use drugs.    Home Medications:  Current Outpatient Medications on File Prior to Visit   Medication Sig   • aspirin 81 MG EC tablet Take 81 mg by mouth Daily.   • ferrous sulfate 325 (65 FE) MG tablet Take 325 mg by mouth Every Other Day. TAKES EVERY OTHER DAY   • Probiotic Product (PROBIOTIC DAILY PO) Take  by mouth Daily.   • tamoxifen (NOLVADEX) 10 MG tablet Take 10 mg by mouth Daily.   • valACYclovir (VALTREX) 1000 MG tablet As Needed.     No current facility-administered medications on file prior to visit.     Allergies:  No Known Allergies    Objective    Objective       Vitals:   Heart Rate:  [66] 66  BP: (124)/(64) 124/64  Body mass index is 24.8 kg/m².     Physical Exam:   Constitutional: Awake, alert, No acute distress    Eyes: PERRLA, sclerae anicteric, no conjunctival injection   HENT: NCAT, mucous membranes moist   Neck: Supple, no thyromegaly, no lymphadenopathy, trachea midline   Respiratory: Clear to auscultation bilaterally, nonlabored respirations    Cardiovascular: RRR, no murmurs or rubs. Palpable pedal  pulses bilaterally   Musculoskeletal: No bilateral ankle edema, no cyanosis to extremities   Psychiatric: Appropriate affect, cooperative   Neurologic: Oriented x 3, strength symmetric in all extremities, Cranial Nerves grossly intact to confrontation, speech clear   Skin: No rashes.    Result Review    Result Review:  I have personally reviewed the available results from  [x]  Laboratory  [x]  EKG  [x]  Cardiology  [x]  Medications  [x]  Old records  []  Other:   Procedures  Results for orders placed in visit on 06/10/22    Adult Transthoracic Echo Complete W/ Cont if Necessary Per Protocol    Interpretation Summary  Normal left ventricular systolic function.  Trace MR and trace TR.     Impression/Plan:  1.  Improved palpitations: 24-hour Holter showed no significant arrhythmias.  Echocardiogram showed no significant valve abnormalities.  Discussed this with the patient.           Rene Dunham MD   06/20/22   12:50 EDT

## 2022-06-20 ENCOUNTER — OFFICE VISIT (OUTPATIENT)
Dept: CARDIOLOGY | Facility: CLINIC | Age: 47
End: 2022-06-20

## 2022-06-20 VITALS
HEART RATE: 66 BPM | WEIGHT: 140 LBS | HEIGHT: 63 IN | BODY MASS INDEX: 24.8 KG/M2 | DIASTOLIC BLOOD PRESSURE: 64 MMHG | SYSTOLIC BLOOD PRESSURE: 124 MMHG

## 2022-06-20 DIAGNOSIS — R00.2 PALPITATIONS: Primary | ICD-10-CM

## 2022-06-20 PROCEDURE — 99212 OFFICE O/P EST SF 10 MIN: CPT | Performed by: SPECIALIST

## 2022-06-20 RX ORDER — VALACYCLOVIR HYDROCHLORIDE 1 G/1
TABLET, FILM COATED ORAL AS NEEDED
COMMUNITY
Start: 2022-06-08

## 2023-03-28 NOTE — PROGRESS NOTES
"Well Woman Visit    CC: Scheduled annual well gyn visit  Chief Complaint   Patient presents with   • Annual Exam     Still bleeding after ablation       [facility] intake in the past?: yes  Previously Qualified? YES, 22 Declined testing     HPI:   48 y.o.   Social History     Substance and Sexual Activity   Sexual Activity Defer   • Birth control/protection: Vasectomy       49 y/o  with LMP 23 here for AP.  Pt with hx breast CA.  Menses lighter since ablation.  They have not stopped but much lighter.  + hot flashes no ALLYSSA.  Last colonoscopy < 1 year ago.  Needs f/u 5 years.  Had breast MRI 2 wks ago in [place].  Need mammogram 2023  Pt has no complaints today.    PCP: does manage PMHx and preventative labs  History: PMHx, Meds, Allergies, PSHx, Social Hx, and POBHx all reviewed and updated.    PHYSICAL EXAM:  /70   Pulse 80   Ht 160 cm (63\")   Wt 63 kg (139 lb)   LMP 2023 (Exact Date)   Breastfeeding No   BMI 24.62 kg/m²  Not found.  General- NAD, alert and oriented, appropriate  Psych- Normal mood, good memory  Neck- No masses, no thyroid enlargement  CV- Regular rhythm, no murnurs  Resp- CTA to bases, no wheezes  Abdomen- Soft, non distended, non tender, no masses    Breast left-  Bilaterally symmetrical, no masses, non tender, no nipple discharge, no axillary or supraclavicular nodes palpable  Breast right- Bilaterally symmetrical, no masses, non tender, no nipple discharge, no axillary or supraclavicular nodes palpable    External genitalia- Normal female, no lesions  Urethra/meatus- Normal, no masses, non tender  Bladder- Normal, no masses, non tender  Vagina- Normal, no atrophy, no lesions, no discharge.  Prolapse: none noted, not examined with split speculum to delineate  Cvx- Normal, no lesions, no discharge, No cervical motion tenderness  Uterus- Normal size, shape & consistency.  Non tender, mobile, & no prolapse  Adnexa- No mass, non tender  Anus/Rectum/Perineum- Not " performed, colonoscopy last year    Lymphatic- No palpable neck, axillary, or groin nodes  Ext- No edema, no cyanosis    Skin- No lesions, no rashes, no acanthosis nigricans      ASSESSMENT and PLAN:    Diagnoses and all orders for this visit:    1. Well woman exam with routine gynecological exam  -     IgP, Aptima HPV; Future  -     IgP, Aptima HPV        Preventative:  • BREAST HEALTH- Monthly self breast exam importance and how to reviewed. MMG and/or MRI (prn) reviewed per society guidelines and her individual history. Screen: Already up to date  • CERVICAL CANCER Screening- Reviewed current ASCCP guidelines for screening w and wo cotest HPV, age specific.  Screen: Updated today  • COLON CANCER Screening- Reviewed current medical society guidelines and options.  Screen:  Already up to date  • Follow up PCP/Specialist PMHx and Labs  [facility]: testing done elsewhere patient with breast cancer      She understands the importance of having any ordered tests to be performed in a timely fashion.  The risks of not performing them include, but are not limited to, advanced cancer stages, bone loss from osteoporosis and/or subsequent increase in morbidity and/or mortality.  She is encouraged to review her results online and/or contact or office if she has questions.     Follow Up:  Return in about 1 year (around 3/31/2024) for Annual physical.            Magda Jean, DO  03/31/2023    [facility]  [address] [place] [address]  Dept: [phone]  Dept Fax: [phone]  Loc: [phone]  Loc Fax: [phone]

## 2023-03-31 ENCOUNTER — OFFICE VISIT (OUTPATIENT)
Dept: OBSTETRICS AND GYNECOLOGY | Facility: CLINIC | Age: 48
End: 2023-03-31
Payer: COMMERCIAL

## 2023-03-31 VITALS
WEIGHT: 139 LBS | DIASTOLIC BLOOD PRESSURE: 70 MMHG | HEIGHT: 63 IN | HEART RATE: 80 BPM | SYSTOLIC BLOOD PRESSURE: 104 MMHG | BODY MASS INDEX: 24.63 KG/M2

## 2023-03-31 DIAGNOSIS — Z01.419 WELL WOMAN EXAM WITH ROUTINE GYNECOLOGICAL EXAM: ICD-10-CM

## 2023-03-31 PROCEDURE — 99396 PREV VISIT EST AGE 40-64: CPT | Performed by: OBSTETRICS & GYNECOLOGY

## 2023-03-31 PROCEDURE — G0123 SCREEN CERV/VAG THIN LAYER: HCPCS | Performed by: OBSTETRICS & GYNECOLOGY

## 2023-03-31 PROCEDURE — 87624 HPV HI-RISK TYP POOLED RSLT: CPT | Performed by: OBSTETRICS & GYNECOLOGY

## 2023-04-06 LAB
CYTOLOGIST CVX/VAG CYTO: NORMAL
CYTOLOGY CVX/VAG DOC CYTO: NORMAL
CYTOLOGY CVX/VAG DOC THIN PREP: NORMAL
DX ICD CODE: NORMAL
HIV 1 & 2 AB SER-IMP: NORMAL
HPV I/H RISK 4 DNA CVX QL PROBE+SIG AMP: NEGATIVE
OTHER STN SPEC: NORMAL
STAT OF ADQ CVX/VAG CYTO-IMP: NORMAL

## 2023-09-26 ENCOUNTER — TELEPHONE (OUTPATIENT)
Dept: OBSTETRICS AND GYNECOLOGY | Facility: CLINIC | Age: 48
End: 2023-09-26
Payer: COMMERCIAL

## 2023-09-26 DIAGNOSIS — N93.9 ABNORMAL UTERINE BLEEDING (AUB): Primary | ICD-10-CM

## 2023-09-26 NOTE — TELEPHONE ENCOUNTER
----- Message from Magda Jean DO sent at 9/26/2023 12:36 PM EDT -----  Regarding: FW: Possible Ultrasound Needed?  Contact: [phone]  I placed the order for the pelvic sono.  Have the patient f/u with me after. Let her know that after ablation, the ultrasound is much less sensitive to picking up uterine abnormalities  ----- Message -----  From: Shelli Mota RegSched Rep  Sent: 9/14/2023   4:09 PM EDT  To: Magda Jean, DO  Subject: FW: Possible Ultrasound Needed?                  Please advise.   ----- Message -----  From: Magda Jean DO  Sent: 9/14/2023  10:17 AM EDT  To: Fouzia Hartman  Subject: FW: Possible Ultrasound Needed?                  Please call patient and notify patient that after ablation, the ultrasound with respect to the endometrial lining is much less sensitive.  I can not assure the patient that she does not have something wrong. Unfortunately if she is having problems and her oncologist thinks there may be a problem, she may have to consider hysterectomy. We can make her an appointment as I have not seen her since 2022 I think  ----- Message -----  From: Joslyn Bee MA  Sent: 9/13/2023  10:56 AM EDT  To: Magda Jean DO  Subject: FW: Possible Ultrasound Needed?                  Please advise.    Thank you,     Anais   ----- Message -----  From: Shruthi Gabriel  Sent: 9/13/2023  10:00 AM EDT  To: [facility]  Subject: Possible Ultrasound Needed?                      Dr Jean -  I had breast cancer a few years ago and now take Tamoxifen.  The Tamoxifen has caused me to be somewhat thrown into menopause (or perimenopause).  On Monday I had my annual checkup with my surgical oncologist.  I expressed concern to him that if I ever accidentally miss a day of taking my Tamoxifen, I have bleeding the next day.  My concern was that my Tamoxifen wasn't working, or that my dosage wasn't high enough, if my system was that fragile  that I would start bleeding if I missed a day of the medication.  He is concerned about that 'breakthrough' bleeding and feels that I should have an ultrasound to check my endometrial lining.  If you should agree with that, could you let me know how to go about getting that scheduled?    Thank you - Shruthi Gabriel

## 2023-10-02 NOTE — PROGRESS NOTES
GYN Visit    Chief Complaint   Patient presents with    Follow-up       HPI:   48 y.o. here to review the results of the ultrasound.  The patient has a history of breast cancer and is currently on tamoxifen with therapy as her breast cancer was estrogen receptor positive.  The patient noted to her oncologist that if she misses her tamoxifen pills, she has vaginal bleeding.  Tamoxifen can be associated with endometrial hyperplasia and possible endometrial cancer so her oncologist requested the patient follow-up with her gynecologist and have a pelvic ultrasound performed.      History: PMHx, Meds, Allergies, PSHx, Social Hx, and POBHx all reviewed and updated.    PHYSICAL EXAM:  /70   Pulse 68   Wt 63 kg (139 lb)   Breastfeeding No   BMI 24.62 kg/m²   General- NAD, alert and oriented, appropriate  Psych- Normal mood, good memory    Ultrasound: Retroverted uterus, lining measures 8 to 9 mm                       Both ovaries with positive blood flow, right ovary with a solid-appearing cyst (endometrioma) 4 x 2.3 cm                       Left ovary with 3 cysts largest measuring 2.5 x 2.2 cm, the cysts appear to have a cracked glass appearance or borders around the cyst with smooth                       No adnexal masses or free fluid is seen    ASSESSMENT AND PLAN:  Diagnoses and all orders for this visit:    1. Abnormal uterine bleeding (AUB) (Primary)   Discussed with the patient the limitations of ultrasound and other imaging modalities such as CT scan and MRI in patients who have had a previous endometrial ablation and as this patient has.  Discussed with the patient that endometrial biopsy, hysteroscopy D&C are not available once we have performed an endometrial ablation due to the distortion of the endometrial cavity.  Discussed that the ultrasound is less sensitive in picking up actual abnormalities in the endometrial cavity after ablation.  Discussed with the patient that most often in patients  who have abnormal bleeding after they have had endometrial ablations that surgical correction is often necessary to rule out an occult malignancy.  We discussed in detail the fact that the patient has a history of breast cancer and that if her breast cancer is unresponsive to the recommended chemotherapy treatment that sometimes the oncologist will request removal of the ovaries.  The patient understands that due to her history of breast cancer she is not a candidate for hormone replacement therapy.  She understands the importance of estrogen and helping to protect her heart from untoward cardiovascular events.  He understands the importance of holding on or retaining her on ovaries if they are not abnormal but certainly if she is going to have a major surgery like hysterectomy she may consider removal of her ovaries.  We discussed hysterectomy in detail discussed the surgery limitations.  The pros and cons were reviewed with the patient her questions were answered to her entirety.  The patient is going to go home and think about if she is interested in having a hysterectomy versus waiting to see if she has any further episodes of bleeding.  She will call with her decision.    2. Malignant neoplasm of upper-outer quadrant of right breast in female, estrogen receptor positive            Follow Up:  No follow-ups on file.          Magda Jean DO  10/05/2023    [facility] [place] [facility]  [facility] [place] [address]  Dept: [phone]  Dept Fax: [phone]  Loc: [phone]  Loc Fax: [phone]

## 2023-10-05 ENCOUNTER — OFFICE VISIT (OUTPATIENT)
Dept: OBSTETRICS AND GYNECOLOGY | Facility: CLINIC | Age: 48
End: 2023-10-05
Payer: COMMERCIAL

## 2023-10-05 VITALS
BODY MASS INDEX: 24.62 KG/M2 | HEART RATE: 68 BPM | SYSTOLIC BLOOD PRESSURE: 111 MMHG | WEIGHT: 139 LBS | DIASTOLIC BLOOD PRESSURE: 70 MMHG

## 2023-10-05 DIAGNOSIS — C50.411 MALIGNANT NEOPLASM OF UPPER-OUTER QUADRANT OF RIGHT BREAST IN FEMALE, ESTROGEN RECEPTOR POSITIVE: ICD-10-CM

## 2023-10-05 DIAGNOSIS — Z17.0 MALIGNANT NEOPLASM OF UPPER-OUTER QUADRANT OF RIGHT BREAST IN FEMALE, ESTROGEN RECEPTOR POSITIVE: ICD-10-CM

## 2023-10-05 DIAGNOSIS — N93.9 ABNORMAL UTERINE BLEEDING (AUB): Primary | ICD-10-CM

## 2023-10-05 PROCEDURE — 99214 OFFICE O/P EST MOD 30 MIN: CPT | Performed by: OBSTETRICS & GYNECOLOGY

## 2023-10-06 PROBLEM — C50.411 BREAST CANCER OF UPPER-OUTER QUADRANT OF RIGHT FEMALE BREAST: Status: ACTIVE | Noted: 2023-10-06

## 2023-10-06 PROBLEM — K51.90 ULCERATIVE COLITIS: Status: ACTIVE | Noted: 2023-10-06

## 2023-11-20 ENCOUNTER — TELEPHONE (OUTPATIENT)
Dept: GASTROENTEROLOGY | Facility: CLINIC | Age: 48
End: 2023-11-20
Payer: COMMERCIAL

## 2023-11-20 ENCOUNTER — CLINICAL SUPPORT (OUTPATIENT)
Dept: GASTROENTEROLOGY | Facility: CLINIC | Age: 48
End: 2023-11-20
Payer: COMMERCIAL

## 2023-11-20 ENCOUNTER — PREP FOR SURGERY (OUTPATIENT)
Dept: GASTROENTEROLOGY | Facility: CLINIC | Age: 48
End: 2023-11-20
Payer: COMMERCIAL

## 2023-11-20 DIAGNOSIS — Z86.010 PERSONAL HISTORY OF COLONIC POLYPS: Primary | ICD-10-CM

## 2023-11-20 PROBLEM — Z86.0100 PERSONAL HISTORY OF COLONIC POLYPS: Status: ACTIVE | Noted: 2023-11-20

## 2023-11-20 NOTE — PROGRESS NOTES
Shruthi Alaniskins  1975  48 y.o.    Reason for call: Recall - Last colonoscopy with Dr. Hoskins on 2022. Hx of colon polyps. No family history of colon cancer.   Prep prescribed: Nulytley  Prep instructions reviewed with patient and sent to patient via [id number]  Is the patient currently on any injectable medications for weight loss or diabetes? No  Clearance needed? No  If yes, what clearance is needed? N/A  Clearance has been requested from N/A  The patient has been scheduled for: Colonoscopy  After your procedure, you will be contacted with results. Please confirm the best phone # to reach the patient: [phone]  Family history of colon cancer? No  If yes, indicate relative: N/A  Family History   Problem Relation Age of Onset    Heart failure Mother     Breast cancer Sister 52    Heart failure Maternal Grandfather     Colon cancer Neg Hx     Malig Hyperthermia Neg Hx     Ovarian cancer Neg Hx     Uterine cancer Neg Hx     Deep vein thrombosis Neg Hx     Pulmonary embolism Neg Hx      Past Medical History:   Diagnosis Date    Breast cancer     Herpes     Menorrhagia     Ulcerative colitis      No Known Allergies  Past Surgical History:   Procedure Laterality Date    BREAST LUMPECTOMY Right      SECTION      COLONOSCOPY      COLONOSCOPY N/A 2022    Procedure: COLONOSCOPY SCREENING WITH INDIGO CARMINE FLUSH, POLYPECTOMY;  Surgeon: Iggy Hoskins MD;  Location: [facility] ENDOSCOPY;  Service: Gastroenterology;  Laterality: N/A;  COLON POLYP    D & C HYSTEROSCOPY ENDOMETRIAL ABLATION N/A 2022    Procedure: DILATATION AND CURETTAGE HYSTEROSCOPY NOVASURE ENDOMETRIAL ABLATION;  Surgeon: Magda Jean DO;  Location: [facility] MAIN OR;  Service: Gynecology;  Laterality: N/A;     Social History     Socioeconomic History    Marital status:    Tobacco Use    Smoking status: Never     Passive exposure: Never    Smokeless tobacco: Never   Vaping Use    Vaping Use: Never used   Substance  and Sexual Activity    Alcohol use: Not Currently    Drug use: Never    Sexual activity: Defer     Birth control/protection: Vasectomy     No current outpatient medications on file.

## 2023-11-20 NOTE — TELEPHONE ENCOUNTER
I left patient a voicemail to return my call to complete phone visit and schedule for colonoscopy. I left the office number as a call back number.

## 2023-11-20 NOTE — TELEPHONE ENCOUNTER
I left patient a 2nd voicemail to return my call to complete phone visit and schedule for colonoscopy. I left the office number as a call back number.

## 2023-12-04 DIAGNOSIS — N93.9 ABNORMAL UTERINE BLEEDING (AUB): Primary | ICD-10-CM

## 2023-12-06 PROBLEM — N93.9 ABNORMAL UTERINE BLEEDING (AUB): Status: ACTIVE | Noted: 2023-12-04

## 2024-01-26 NOTE — PROGRESS NOTES
"GYN Visit    Chief Complaint   Patient presents with    Pre-op Exam     TOTAL LAPAROSCOPIC HYSTERECTOMYTOTAL with  BILATERAL SALPINGECTOMY       HPI:   48 y.o. with abnormal bleeding after endometrial ablation scheduled for TLH. Pt has hx breast cancer which was estrogen receptor +. The patient is on tamoxifen to prevent recurrence.  If the patient misses her dose of tamoxifen she has vaginal bleeding. Since the patient has had an ablation, she is not a candidate for endometrial biopsy so she is therefore scheduled for hysterectomy.  Pt here today with her  to review procedure.  She has no new complaints.      History: PMHx, Meds, Allergies, PSHx, Social Hx, and POBHx all reviewed and updated.    PHYSICAL EXAM:  /86   Pulse 73   Ht 160 cm (63\")   Wt 64.4 kg (142 lb)   LMP 01/15/2024   Breastfeeding No   BMI 25.15 kg/m²   General- NAD, alert and oriented, appropriate  Psych- Normal mood, good memory        ASSESSMENT AND PLAN:  Diagnoses and all orders for this visit:    1. Abnormal uterine bleeding (AUB) (Primary)   The patient was counseled to the risks and benefits of this procedure to include infection, bleeding, damage to internal organs, bowels, bladder, blood vessels, other internal organs requiring additional surgery to repair or remove any damaged structures.  If there is damage to the bowels may require exploratory laparotomy with primary repair for bowel injury.  If there is damage to the bladder may require exploratory laparotomy with primary repair of bladder injury going home with an indwelling Macias catheter to be removed at a later date.  If there is damage to the ureters may require exploratory laparotomy with primary repair of ureteral injury going home with an indwelling ureteral stent to be removed at a later date.  Patient is to have complications that are recognized at the time of surgery may have a change in the original operation as well as the postoperative stay in " the hospital but usually will make a full recovery.  Patient is to have complications that occur that are either not recognized or occur in the postoperative period may have a prolonged recovery period and sometimes an incomplete recovery.  The patient was given the opportunity to ask questions and her questions were answered to her satisfaction.     2. History of endometrial ablation    3. History of breast cancer            Follow Up:  No follow-ups on file.          Magda Jean DO  01/29/2024    [facility] [place] [facility]  [facility] [place] [address]  Dept: [phone]  Dept Fax: [phone]  Loc: [phone]  Loc Fax: [phone]

## 2024-01-29 ENCOUNTER — OFFICE VISIT (OUTPATIENT)
Dept: OBSTETRICS AND GYNECOLOGY | Facility: CLINIC | Age: 49
End: 2024-01-29
Payer: COMMERCIAL

## 2024-01-29 ENCOUNTER — PRE-ADMISSION TESTING (OUTPATIENT)
Dept: PREADMISSION TESTING | Facility: HOSPITAL | Age: 49
End: 2024-01-29
Payer: COMMERCIAL

## 2024-01-29 VITALS
SYSTOLIC BLOOD PRESSURE: 123 MMHG | HEIGHT: 63 IN | HEART RATE: 73 BPM | DIASTOLIC BLOOD PRESSURE: 86 MMHG | WEIGHT: 142 LBS | BODY MASS INDEX: 25.16 KG/M2

## 2024-01-29 VITALS
SYSTOLIC BLOOD PRESSURE: 118 MMHG | HEART RATE: 78 BPM | WEIGHT: 141.76 LBS | HEIGHT: 63 IN | DIASTOLIC BLOOD PRESSURE: 64 MMHG | BODY MASS INDEX: 25.12 KG/M2 | RESPIRATION RATE: 16 BRPM | TEMPERATURE: 97.5 F

## 2024-01-29 DIAGNOSIS — Z85.3 HISTORY OF BREAST CANCER: ICD-10-CM

## 2024-01-29 DIAGNOSIS — Z98.890 HISTORY OF ENDOMETRIAL ABLATION: ICD-10-CM

## 2024-01-29 DIAGNOSIS — N93.9 ABNORMAL UTERINE BLEEDING (AUB): Primary | ICD-10-CM

## 2024-01-29 DIAGNOSIS — Z01.818 PREOP TESTING: Primary | ICD-10-CM

## 2024-01-29 PROCEDURE — 99214 OFFICE O/P EST MOD 30 MIN: CPT | Performed by: OBSTETRICS & GYNECOLOGY

## 2024-01-29 PROCEDURE — 93005 ELECTROCARDIOGRAM TRACING: CPT

## 2024-01-29 RX ORDER — OMEGA-3S/DHA/EPA/FISH OIL/D3 300MG-1000
400 CAPSULE ORAL DAILY
COMMUNITY

## 2024-01-29 NOTE — DISCHARGE INSTRUCTIONS
IMPORTANT INSTRUCTIONS - PRE-ADMISSION TESTING  DO NOT EAT OR CHEW anything after midnight the night before your procedure.    You may have CLEAR liquids up to ___2___ hours prior to ARRIVAL time.   Take the following medications the morning of your procedure with JUST A SIP OF WATER:           None     DO NOT BRING your medications to the hospital with you, UNLESS something has changed since your PRE-Admission Testing appointment.  Hold all vitamins, supplements, and NSAIDS (Non- steroidal anti-inflammatory meds) for one week prior to surgery (you MAY take Tylenol or Acetaminophen). Stop 1-30-24                Stop vitamin D and probiotic    If you are diabetic, check your blood sugar the morning of your procedure. If it is less than 70 or if you are feeling symptomatic, call the following number for further instructions: [phone].  Use your inhalers/nebulizers as usual, the morning of your procedure. BRING YOUR INHALERS with you.   Bring your CPAP or BIPAP to hospital, ONLY IF YOU WILL BE SPENDING THE NIGHT.   Make sure you have a ride home and have someone who will stay with you the day of your procedure after you go home.  If you have any questions, please call your Pre-Admission Testing Nurse, __Tracy JULIEN _ at [phone].   Per anesthesia request, do not smoke for 24 hours before your procedure or as instructed by your surgeon.        PREOPERATIVE (BEFORE SURGERY)              BATHING INSTRUCTIONS  Instructions:    You will need to shower 1 separate times utilizing the soap provided; at the times indicated   below:     2-5-24 Monday PM         Or   2-6-24 Tuesday AM     Wash your hair and face with normal shampoo and soap, rinse it well before using the surgical soap.      In the shower, wet the skin completely with water from your neck to your feet. Apply the cleanser to your   body ONLY FROM THE NECK TO YOUR FEET.     Do NOT USE THE CLEANSER ON YOUR FACE, HEAD, OR GENITAL (PRIVATE) AREAS.   Keep it out  of your eyes, ears, and mouth because of the risk of injury to those areas.      Scrub with a clean washcloth for each bath utilizing the soap provided from the top of your body to the   bottom starting at the neck area.      Pay close attention to your armpits, groin area, and the site of surgery.      Wash your body gently for 5 minutes. Stand outside the stream or turn off the water while scrubbing your   body. Do NOT wash with your regular soap after the surgical cleanser is used.      RINSE THE CLEANSER OFF COMPLETELY with plenty of water. Rinse the area again thoroughly.      Dry off with a clean towel. The surgical soap can cause dryness; however do NOT APPLY LOTION,   CREAM, POWDER, and/or DEODORANT AFTER SHOWERING.     Be sure to where clean clothes after showering.      Ensure CLEAN BED LINENS AFTER FIRST wash with the surgical soap.      NO PETS ALLOWED IN THE BED with you after utilizing the surgical soap.    Clear Liquid Diet        Find out when you need to start a clear liquid diet.   Think of “clear liquids” as anything you could read a newspaper through. This includes things like water, broth, sports drinks, or tea WITHOUT any kind of milk or cream.           Once you are told to start a clear liquid diet, only drink these things until 2 hours before arrival to the hospital or when the hospital says to stop. Total volume limitation: 8 oz.       Clear liquids you CAN drink:   Water   Clear broth: beef, chicken, vegetable, or bone broth with nothing in it   Gatorade   Lemonade or Dax-aid   Soda   Tea, coffee (NO cream or honey)   Jell-O (without fruit)   Popsicles (without fruit or cream)   Italian ices   Juice without pulp: apple, white, grape   You may use salt, pepper, and sugar  No red  No noodles    Do NOT drink:   Milk or cream   Soy milk, almond milk, coconut milk, or other non-dairy drinks and   creamers   Milkshakes or smoothies   Tomato juice   Orange juice   Grapefruit juice   Cream soups  or any other than broth         Clear Liquid Diet:  Do NOT eat any solid food.  Do NOT eat or suck on mints or candy.  Do NOT chew gum.  Do NOT drink thick liquids like milk or juice with pulp in it.  Do NOT add milk, cream, or anything like soy milk or almond milk to coffee or tea.

## 2024-01-30 LAB
QT INTERVAL: 402 MS
QTC INTERVAL: 426 MS

## 2024-02-11 ENCOUNTER — HOSPITAL ENCOUNTER (EMERGENCY)
Facility: HOSPITAL | Age: 49
Discharge: HOME | End: 2024-02-11
Attending: EMERGENCY MEDICINE | Admitting: EMERGENCY MEDICINE
Payer: COMMERCIAL

## 2024-02-11 ENCOUNTER — APPOINTMENT (OUTPATIENT)
Dept: CT IMAGING | Facility: HOSPITAL | Age: 49
End: 2024-02-11
Payer: COMMERCIAL

## 2024-02-11 VITALS
RESPIRATION RATE: 18 BRPM | SYSTOLIC BLOOD PRESSURE: 124 MMHG | HEART RATE: 70 BPM | WEIGHT: 124.12 LBS | DIASTOLIC BLOOD PRESSURE: 77 MMHG | TEMPERATURE: 98.2 F | BODY MASS INDEX: 21.99 KG/M2 | OXYGEN SATURATION: 95 % | HEIGHT: 63 IN

## 2024-02-11 DIAGNOSIS — R51.9 NONINTRACTABLE HEADACHE, UNSPECIFIED CHRONICITY PATTERN, UNSPECIFIED HEADACHE TYPE: Primary | ICD-10-CM

## 2024-02-11 LAB
ALBUMIN SERPL-MCNC: 4 G/DL (ref 3.5–5.2)
ALBUMIN/GLOB SERPL: 1.2 G/DL
ALP SERPL-CCNC: 67 U/L (ref 39–117)
ALT SERPL W P-5'-P-CCNC: 12 U/L (ref 1–33)
ANION GAP SERPL CALCULATED.3IONS-SCNC: 12.6 MMOL/L (ref 5–15)
AST SERPL-CCNC: 12 U/L (ref 1–32)
BASOPHILS # BLD AUTO: 0.03 10*3/MM3 (ref 0–0.2)
BASOPHILS NFR BLD AUTO: 0.3 % (ref 0–1.5)
BILIRUB SERPL-MCNC: 0.3 MG/DL (ref 0–1.2)
BILIRUB UR QL STRIP: NEGATIVE
BUN SERPL-MCNC: 14 MG/DL (ref 6–20)
BUN/CREAT SERPL: 20.6 (ref 7–25)
CALCIUM SPEC-SCNC: 9.3 MG/DL (ref 8.6–10.5)
CHLORIDE SERPL-SCNC: 107 MMOL/L (ref 98–107)
CLARITY UR: CLEAR
CO2 SERPL-SCNC: 20.4 MMOL/L (ref 22–29)
COLOR UR: YELLOW
CREAT SERPL-MCNC: 0.68 MG/DL (ref 0.57–1)
DEPRECATED RDW RBC AUTO: 38 FL (ref 37–54)
EGFRCR SERPLBLD CKD-EPI 2021: 105.6 ML/MIN/1.73
EOSINOPHIL # BLD AUTO: 0.03 10*3/MM3 (ref 0–0.4)
EOSINOPHIL NFR BLD AUTO: 0.3 % (ref 0.3–6.2)
ERYTHROCYTE [DISTWIDTH] IN BLOOD BY AUTOMATED COUNT: 11.7 % (ref 12.3–15.4)
FLUAV SUBTYP SPEC NAA+PROBE: NOT DETECTED
FLUBV RNA ISLT QL NAA+PROBE: NOT DETECTED
GLOBULIN UR ELPH-MCNC: 3.4 GM/DL
GLUCOSE SERPL-MCNC: 98 MG/DL (ref 65–99)
GLUCOSE UR STRIP-MCNC: NEGATIVE MG/DL
HCT VFR BLD AUTO: 42.6 % (ref 34–46.6)
HGB BLD-MCNC: 14.2 G/DL (ref 12–15.9)
HGB UR QL STRIP.AUTO: NEGATIVE
IMM GRANULOCYTES # BLD AUTO: 0.16 10*3/MM3 (ref 0–0.05)
IMM GRANULOCYTES NFR BLD AUTO: 1.5 % (ref 0–0.5)
KETONES UR QL STRIP: NEGATIVE
LEUKOCYTE ESTERASE UR QL STRIP.AUTO: NEGATIVE
LYMPHOCYTES # BLD AUTO: 1.4 10*3/MM3 (ref 0.7–3.1)
LYMPHOCYTES NFR BLD AUTO: 12.9 % (ref 19.6–45.3)
MCH RBC QN AUTO: 29.9 PG (ref 26.6–33)
MCHC RBC AUTO-ENTMCNC: 33.3 G/DL (ref 31.5–35.7)
MCV RBC AUTO: 89.7 FL (ref 79–97)
MONOCYTES # BLD AUTO: 0.57 10*3/MM3 (ref 0.1–0.9)
MONOCYTES NFR BLD AUTO: 5.2 % (ref 5–12)
NEUTROPHILS NFR BLD AUTO: 79.8 % (ref 42.7–76)
NEUTROPHILS NFR BLD AUTO: 8.7 10*3/MM3 (ref 1.7–7)
NITRITE UR QL STRIP: NEGATIVE
NRBC BLD AUTO-RTO: 0 /100 WBC (ref 0–0.2)
PH UR STRIP.AUTO: 7 [PH] (ref 5–8)
PLATELET # BLD AUTO: 393 10*3/MM3 (ref 140–450)
PMV BLD AUTO: 9.7 FL (ref 6–12)
POTASSIUM SERPL-SCNC: 3.6 MMOL/L (ref 3.5–5.2)
PROT SERPL-MCNC: 7.4 G/DL (ref 6–8.5)
PROT UR QL STRIP: NEGATIVE
RBC # BLD AUTO: 4.75 10*6/MM3 (ref 3.77–5.28)
RSV RNA NPH QL NAA+NON-PROBE: NOT DETECTED
SARS-COV-2 RNA RESP QL NAA+PROBE: NOT DETECTED
SODIUM SERPL-SCNC: 140 MMOL/L (ref 136–145)
SP GR UR STRIP: 1.01 (ref 1–1.03)
UROBILINOGEN UR QL STRIP: NORMAL
WBC NRBC COR # BLD AUTO: 10.89 10*3/MM3 (ref 3.4–10.8)

## 2024-02-11 PROCEDURE — 99285 EMERGENCY DEPT VISIT HI MDM: CPT

## 2024-02-11 PROCEDURE — 25010000002 METOCLOPRAMIDE PER 10 MG: Performed by: EMERGENCY MEDICINE

## 2024-02-11 PROCEDURE — 85025 COMPLETE CBC W/AUTO DIFF WBC: CPT | Performed by: EMERGENCY MEDICINE

## 2024-02-11 PROCEDURE — 80053 COMPREHEN METABOLIC PANEL: CPT | Performed by: EMERGENCY MEDICINE

## 2024-02-11 PROCEDURE — 25510000001 IOPAMIDOL PER 1 ML: Performed by: EMERGENCY MEDICINE

## 2024-02-11 PROCEDURE — 81003 URINALYSIS AUTO W/O SCOPE: CPT | Performed by: EMERGENCY MEDICINE

## 2024-02-11 PROCEDURE — 96375 TX/PRO/DX INJ NEW DRUG ADDON: CPT

## 2024-02-11 PROCEDURE — 70450 CT HEAD/BRAIN W/O DYE: CPT

## 2024-02-11 PROCEDURE — 70496 CT ANGIOGRAPHY HEAD: CPT

## 2024-02-11 PROCEDURE — 70498 CT ANGIOGRAPHY NECK: CPT

## 2024-02-11 PROCEDURE — 25010000002 DEXAMETHASONE SODIUM PHOSPHATE 10 MG/ML SOLUTION: Performed by: EMERGENCY MEDICINE

## 2024-02-11 PROCEDURE — 25010000002 DIPHENHYDRAMINE PER 50 MG: Performed by: EMERGENCY MEDICINE

## 2024-02-11 PROCEDURE — 36415 COLL VENOUS BLD VENIPUNCTURE: CPT

## 2024-02-11 PROCEDURE — 25810000003 SODIUM CHLORIDE 0.9 % SOLUTION: Performed by: EMERGENCY MEDICINE

## 2024-02-11 PROCEDURE — 87637 SARSCOV2&INF A&B&RSV AMP PRB: CPT | Performed by: EMERGENCY MEDICINE

## 2024-02-11 PROCEDURE — 25010000002 KETOROLAC TROMETHAMINE PER 15 MG: Performed by: EMERGENCY MEDICINE

## 2024-02-11 PROCEDURE — 96374 THER/PROPH/DIAG INJ IV PUSH: CPT

## 2024-02-11 RX ORDER — BUTALBITAL, ACETAMINOPHEN AND CAFFEINE 50; 325; 40 MG/1; MG/1; MG/1
1 TABLET ORAL EVERY 6 HOURS PRN
Qty: 12 TABLET | Refills: 0 | Status: SHIPPED | OUTPATIENT
Start: 2024-02-11

## 2024-02-11 RX ORDER — PREDNISONE 10 MG/1
10 TABLET ORAL 3 TIMES DAILY
COMMUNITY
Start: 2024-02-06

## 2024-02-11 RX ORDER — CEFDINIR 300 MG/1
300 CAPSULE ORAL EVERY 12 HOURS SCHEDULED
COMMUNITY
Start: 2024-02-06

## 2024-02-11 RX ORDER — PROMETHAZINE HYDROCHLORIDE 25 MG/1
25 TABLET ORAL EVERY 6 HOURS PRN
COMMUNITY
Start: 2024-02-06

## 2024-02-11 RX ORDER — METOCLOPRAMIDE HYDROCHLORIDE 5 MG/ML
10 INJECTION INTRAMUSCULAR; INTRAVENOUS ONCE
Status: COMPLETED | OUTPATIENT
Start: 2024-02-11 | End: 2024-02-11

## 2024-02-11 RX ORDER — IBUPROFEN 800 MG/1
800 TABLET ORAL 3 TIMES DAILY PRN
COMMUNITY
Start: 2024-02-06

## 2024-02-11 RX ORDER — DIPHENHYDRAMINE HYDROCHLORIDE 50 MG/ML
12.5 INJECTION INTRAMUSCULAR; INTRAVENOUS ONCE
Status: COMPLETED | OUTPATIENT
Start: 2024-02-11 | End: 2024-02-11

## 2024-02-11 RX ORDER — KETOROLAC TROMETHAMINE 30 MG/ML
30 INJECTION, SOLUTION INTRAMUSCULAR; INTRAVENOUS ONCE
Status: COMPLETED | OUTPATIENT
Start: 2024-02-11 | End: 2024-02-11

## 2024-02-11 RX ORDER — ACETAMINOPHEN 500 MG
1000 TABLET ORAL ONCE
Status: COMPLETED | OUTPATIENT
Start: 2024-02-11 | End: 2024-02-11

## 2024-02-11 RX ORDER — DEXAMETHASONE SODIUM PHOSPHATE 10 MG/ML
10 INJECTION, SOLUTION INTRAMUSCULAR; INTRAVENOUS ONCE
Status: COMPLETED | OUTPATIENT
Start: 2024-02-11 | End: 2024-02-11

## 2024-02-11 RX ORDER — METOCLOPRAMIDE 10 MG/1
10 TABLET ORAL 2 TIMES DAILY PRN
Qty: 20 TABLET | Refills: 0 | Status: SHIPPED | OUTPATIENT
Start: 2024-02-11

## 2024-02-11 RX ORDER — KETOROLAC TROMETHAMINE 10 MG/1
10 TABLET, FILM COATED ORAL EVERY 6 HOURS PRN
Qty: 15 TABLET | Refills: 0 | Status: SHIPPED | OUTPATIENT
Start: 2024-02-11

## 2024-02-11 RX ORDER — FEXOFENADINE HCL 180 MG/1
1 TABLET ORAL DAILY
COMMUNITY
Start: 2024-02-06

## 2024-02-11 RX ADMIN — IOPAMIDOL 100 ML: 755 INJECTION, SOLUTION INTRAVENOUS at 11:48

## 2024-02-11 RX ADMIN — DIPHENHYDRAMINE HYDROCHLORIDE 12.5 MG: 50 INJECTION, SOLUTION INTRAMUSCULAR; INTRAVENOUS at 11:02

## 2024-02-11 RX ADMIN — METOCLOPRAMIDE HYDROCHLORIDE 10 MG: 5 INJECTION INTRAMUSCULAR; INTRAVENOUS at 11:02

## 2024-02-11 RX ADMIN — KETOROLAC TROMETHAMINE 30 MG: 30 INJECTION, SOLUTION INTRAMUSCULAR; INTRAVENOUS at 11:01

## 2024-02-11 RX ADMIN — SODIUM CHLORIDE 1000 ML: 9 INJECTION, SOLUTION INTRAVENOUS at 10:58

## 2024-02-11 RX ADMIN — ACETAMINOPHEN 1000 MG: 500 TABLET ORAL at 10:59

## 2024-02-11 RX ADMIN — DEXAMETHASONE SODIUM PHOSPHATE 10 MG: 10 INJECTION INTRAMUSCULAR; INTRAVENOUS at 11:14

## 2024-02-14 ENCOUNTER — TELEPHONE (OUTPATIENT)
Dept: GASTROENTEROLOGY | Facility: CLINIC | Age: 49
End: 2024-02-14
Payer: COMMERCIAL

## 2024-03-25 ENCOUNTER — TELEPHONE (OUTPATIENT)
Dept: OBSTETRICS AND GYNECOLOGY | Facility: CLINIC | Age: 49
End: 2024-03-25
Payer: COMMERCIAL

## 2024-03-25 DIAGNOSIS — N64.4 BREAST PAIN, LEFT: Primary | ICD-10-CM

## 2024-03-25 NOTE — TELEPHONE ENCOUNTER
----- Message from Shruthi Gabriel sent at 3/24/2024 11:46 PM EDT -----  Regarding: Medical concern  Contact: [phone]  I was diagnosed with breast cancer in my right breast in 2021 after an MRI that was ordered by Dr Jean.  I am scheduled to have my annual mammogram in September, but I am having suspicious and concerning pain in my left breast.   I will be in [place] for my annual checkup with Dr Jean on April 11th.   Would it be possible for me to have either a mammogram or MRI done on my left breast that same day while I am in [facility] for this appointment?

## 2024-03-25 NOTE — TELEPHONE ENCOUNTER
Spoke to the patient. The pain is on the inner left breast towards the nipple. It is a sharp, intermittent pain but it has not changed in frequency or intensity since it began 4 days ago. It okay to order please sign the attached orders for diagnostic imaging and notify when signed.

## 2024-03-26 ENCOUNTER — TELEPHONE (OUTPATIENT)
Dept: GASTROENTEROLOGY | Facility: CLINIC | Age: 49
End: 2024-03-26
Payer: COMMERCIAL

## 2024-03-26 NOTE — TELEPHONE ENCOUNTER
Rec'd call that pt cx'd Colonoscopy.     Called her and was able to get her r/s'd to 4.30.24 @ 12:00 pm.

## 2024-04-10 NOTE — PROGRESS NOTES
"Well Woman Visit    CC: Scheduled annual well gyn visit  Chief Complaint   Patient presents with    Gynecologic Exam       HPI:   49 y.o.   Social History     Substance and Sexual Activity   Sexual Activity Defer    Birth control/protection: Vasectomy       50 y/o  with LMP 3/29/24 here for AP.  Pt scheduled for TLH due to irregular bleeding. Pt states now spotting q 2 weeks. Pt with hx breast cancer. Has appt for mammogram and colonoscopy  Pt has no complaints today.    PCP: does manage PMHx and preventative labs  History: PMHx, Meds, Allergies, PSHx, Social Hx, and POBHx all reviewed and updated.    PHYSICAL EXAM:  /77   Pulse 59   Ht 160 cm (63\")   Wt 63 kg (139 lb)   LMP 2024 Comment: Spotting, menses before was 2 months ago, light, lasting 3-4 days  Breastfeeding No   BMI 24.62 kg/m²  Not found. Chaperone present  General- NAD, alert and oriented, appropriate  Psych- Normal mood, good memory  Neck- No masses, no thyroid enlargement  CV- Regular rhythm, no murmurs  Resp- CTA to bases, no wheezes  Abdomen- Soft, non distended, non tender, no masses    Chaperone present during breast and/or pelvic exam if performed.  Breast left-  Bilaterally symmetrical, no masses, non tender, no nipple discharge, no axillary or supraclavicular nodes palpable.    Breast right- Bilaterally symmetrical, no masses, non tender, no nipple discharge, no axillary or supraclavicular nodes palpable.      External genitalia- Normal female, no lesions  Urethra/meatus- Normal, no masses, non tender  Bladder- Normal, no masses, non tender  Vagina- Normal, no atrophy, no lesions, no discharge.  + pinkish discharge in vaginal vault    Cvx- Normal, no lesions, no discharge, No cervical motion tenderness  Uterus- Normal size, shape & consistency.  Non tender, mobile.  Adnexa- No mass, non tender  Anus/Rectum/Perineum- Not performed    Lymphatic- No palpable neck, axillary, or groin nodes  Ext- No edema, no cyanosis  "   Skin- No lesions, no rashes, no acanthosis nigricans      ASSESSMENT and PLAN:    Diagnoses and all orders for this visit:    1. Well woman exam with routine gynecological exam (Primary)  -     IGP,rfx Aptima HPV All Pth        Preventative:  BREAST HEALTH- Monthly self breast exam importance and how to reviewed. MMG and/or MRI (prn) reviewed per society guidelines and her individual history. Screen: Already up to date  CERVICAL CANCER Screening- Reviewed current ASCCP guidelines for screening w and wo cotest HPV, age specific.  Screen: Updated today  COLON CANCER Screening- Reviewed current medical society guidelines and options.  Screen:  Already up to date  Follow up PCP/Specialist PMHx and/or Labs          She understands the importance of having any ordered tests to be performed in a timely fashion.  The risks of not performing them include, but are not limited to, advanced cancer stages, bone loss from osteoporosis and/or subsequent increase in morbidity and/or mortality.  She is encouraged to review her results online and/or contact or office if she has questions.     Follow Up:  Return in about 1 year (around 4/12/2025) for Annual physical.            Magda Jean,   04/12/2024    [facility] [place] [facility]  [facility] [place] [address]  Dept: [phone]  Dept Fax: [phone]  Loc: [phone]  Loc Fax: [phone]

## 2024-04-12 ENCOUNTER — HOSPITAL ENCOUNTER (OUTPATIENT)
Dept: MAMMOGRAPHY | Facility: HOSPITAL | Age: 49
Discharge: HOME | End: 2024-04-12
Payer: COMMERCIAL

## 2024-04-12 ENCOUNTER — OFFICE VISIT (OUTPATIENT)
Dept: OBSTETRICS AND GYNECOLOGY | Facility: CLINIC | Age: 49
End: 2024-04-12
Payer: COMMERCIAL

## 2024-04-12 ENCOUNTER — HOSPITAL ENCOUNTER (OUTPATIENT)
Dept: ULTRASOUND IMAGING | Facility: HOSPITAL | Age: 49
Discharge: HOME | End: 2024-04-12
Payer: COMMERCIAL

## 2024-04-12 VITALS
SYSTOLIC BLOOD PRESSURE: 114 MMHG | WEIGHT: 139 LBS | BODY MASS INDEX: 24.63 KG/M2 | HEART RATE: 59 BPM | DIASTOLIC BLOOD PRESSURE: 77 MMHG | HEIGHT: 63 IN

## 2024-04-12 DIAGNOSIS — Z01.419 WELL WOMAN EXAM WITH ROUTINE GYNECOLOGICAL EXAM: Primary | ICD-10-CM

## 2024-04-12 DIAGNOSIS — N64.4 BREAST PAIN, LEFT: ICD-10-CM

## 2024-04-12 PROCEDURE — G0123 SCREEN CERV/VAG THIN LAYER: HCPCS | Performed by: OBSTETRICS & GYNECOLOGY

## 2024-04-12 PROCEDURE — 99396 PREV VISIT EST AGE 40-64: CPT | Performed by: OBSTETRICS & GYNECOLOGY

## 2024-04-17 LAB
CONV .: NORMAL
CYTOLOGIST CVX/VAG CYTO: NORMAL
CYTOLOGY CVX/VAG DOC CYTO: NORMAL
CYTOLOGY CVX/VAG DOC THIN PREP: NORMAL
DX ICD CODE: NORMAL
Lab: NORMAL
OTHER STN SPEC: NORMAL
STAT OF ADQ CVX/VAG CYTO-IMP: NORMAL

## 2024-04-22 ENCOUNTER — HOSPITAL ENCOUNTER (OUTPATIENT)
Dept: OTHER | Facility: HOSPITAL | Age: 49
Discharge: HOME | End: 2024-04-22
Payer: COMMERCIAL

## 2024-04-25 ENCOUNTER — HOSPITAL ENCOUNTER (OUTPATIENT)
Dept: OTHER | Facility: HOSPITAL | Age: 49
Discharge: HOME | End: 2024-04-25

## 2024-04-26 NOTE — PRE-PROCEDURE INSTRUCTIONS
Arrival time of 1100    Must have a  over 18 years old for transportation post prodecure, as well as photo ID and insurance card.    Education provided on laxative administration; bowel prep to be taken in two doses. Reviewed diet instructions for day prior to procedure. Only plain, unflavored water after midnight until two hours prior to arrival time.    Take any morning medications of the day and the procedure as well as the night before. Bring all prescribed medications or a list of medications and inhalers to the hospital on the morning of the procedure.

## 2024-04-29 ENCOUNTER — ANESTHESIA EVENT (OUTPATIENT)
Dept: GASTROENTEROLOGY | Facility: HOSPITAL | Age: 49
End: 2024-04-29
Payer: COMMERCIAL

## 2024-04-29 NOTE — ANESTHESIA PREPROCEDURE EVALUATION
Anesthesia Evaluation     Patient summary reviewed and Nursing notes reviewed   NPO Solid Status: > 8 hours  NPO Liquid Status: > 4 hours           Airway   Mallampati: I  TM distance: >3 FB  Neck ROM: full  No difficulty expected  Dental - normal exam     Pulmonary - normal exam    breath sounds clear to auscultation  Cardiovascular - normal exam  Exercise tolerance: good (4-7 METS)    Rhythm: regular  Rate: normal        Neuro/Psych  GI/Hepatic/Renal/Endo      Musculoskeletal     Abdominal    Substance History      OB/GYN          Other      history of cancer (s/p right breast CA) remission    ROS/Med Hx Other: Hx UC     Patient with poison ivy/oak on face       Phys Exam Other: Poison ivy/oak on right eye /neck             Anesthesia Plan    ASA 2     general   total IV anesthesia  (Total IV Anesthesia    Patient understands anesthesia not responsible for dental damage.  )  intravenous induction     Anesthetic plan, risks, benefits, and alternatives have been provided, discussed and informed consent has been obtained with: patient.  Pre-procedure education provided  Plan discussed with CRNA.    CODE STATUS:

## 2024-04-30 ENCOUNTER — ANESTHESIA (OUTPATIENT)
Dept: GASTROENTEROLOGY | Facility: HOSPITAL | Age: 49
End: 2024-04-30
Payer: COMMERCIAL

## 2024-04-30 ENCOUNTER — HOSPITAL ENCOUNTER (OUTPATIENT)
Facility: HOSPITAL | Age: 49
Setting detail: HOSPITAL OUTPATIENT SURGERY
Discharge: HOME | End: 2024-04-30
Attending: INTERNAL MEDICINE | Admitting: INTERNAL MEDICINE
Payer: COMMERCIAL

## 2024-04-30 VITALS
SYSTOLIC BLOOD PRESSURE: 138 MMHG | TEMPERATURE: 97 F | DIASTOLIC BLOOD PRESSURE: 80 MMHG | BODY MASS INDEX: 24.92 KG/M2 | RESPIRATION RATE: 16 BRPM | OXYGEN SATURATION: 100 % | WEIGHT: 140.65 LBS | HEART RATE: 73 BPM

## 2024-04-30 LAB — B-HCG UR QL: NEGATIVE

## 2024-04-30 PROCEDURE — 25010000002 METHYLENE BLUE 50 MG/10ML SOLUTION 10 ML AMPULE: Performed by: INTERNAL MEDICINE

## 2024-04-30 PROCEDURE — 81025 URINE PREGNANCY TEST: CPT | Performed by: INTERNAL MEDICINE

## 2024-04-30 PROCEDURE — 25810000003 LACTATED RINGERS PER 1000 ML: Performed by: NURSE ANESTHETIST, CERTIFIED REGISTERED

## 2024-04-30 PROCEDURE — 25010000002 PROPOFOL 10 MG/ML EMULSION: Performed by: NURSE ANESTHETIST, CERTIFIED REGISTERED

## 2024-04-30 RX ORDER — LIDOCAINE HYDROCHLORIDE 20 MG/ML
INJECTION, SOLUTION EPIDURAL; INFILTRATION; INTRACAUDAL; PERINEURAL AS NEEDED
Status: DISCONTINUED | OUTPATIENT
Start: 2024-04-30 | End: 2024-04-30 | Stop reason: SURG

## 2024-04-30 RX ORDER — PROPOFOL 10 MG/ML
VIAL (ML) INTRAVENOUS AS NEEDED
Status: DISCONTINUED | OUTPATIENT
Start: 2024-04-30 | End: 2024-04-30 | Stop reason: SURG

## 2024-04-30 RX ORDER — SODIUM CHLORIDE, SODIUM LACTATE, POTASSIUM CHLORIDE, CALCIUM CHLORIDE 600; 310; 30; 20 MG/100ML; MG/100ML; MG/100ML; MG/100ML
30 INJECTION, SOLUTION INTRAVENOUS CONTINUOUS
Status: DISCONTINUED | OUTPATIENT
Start: 2024-04-30 | End: 2024-04-30 | Stop reason: HOSPADM

## 2024-04-30 RX ADMIN — PROPOFOL 225 MCG/KG/MIN: 10 INJECTION, EMULSION INTRAVENOUS at 13:02

## 2024-04-30 RX ADMIN — SODIUM CHLORIDE, POTASSIUM CHLORIDE, SODIUM LACTATE AND CALCIUM CHLORIDE 30 ML/HR: 600; 310; 30; 20 INJECTION, SOLUTION INTRAVENOUS at 12:05

## 2024-04-30 RX ADMIN — PROPOFOL 70 MG: 10 INJECTION, EMULSION INTRAVENOUS at 13:02

## 2024-04-30 RX ADMIN — LIDOCAINE HYDROCHLORIDE 50 MG: 20 INJECTION, SOLUTION INTRAVENOUS at 13:02

## 2024-04-30 NOTE — H&P
Pre Procedure History & Physical    Chief Complaint:   Past history colon polyps    Subjective     HPI:   History of colon polyps    Past Medical History:   Past Medical History:   Diagnosis Date    Abnormal uterine bleeding (AUB)     Breast cancer     remission    Family history of cardiac disorder     see's dr jacob. pt denied any cardiac hx just used md as preventive measures    Herpes     Menorrhagia     Palpitations     x1- seen nidia, since family hx of cardiac problems. pt denied any problems at this time.    Ulcerative colitis        Past Surgical History:  Past Surgical History:   Procedure Laterality Date    BREAST LUMPECTOMY Right     NO BP OR NEEDLE STICKS RIGHT SIDE     SECTION      COLONOSCOPY      COLONOSCOPY N/A 2022    Procedure: COLONOSCOPY SCREENING WITH INDIGO CARMINE FLUSH, POLYPECTOMY;  Surgeon: Iggy Hoskins MD;  Location: [facility] ENDOSCOPY;  Service: Gastroenterology;  Laterality: N/A;  COLON POLYP    D & C HYSTEROSCOPY ENDOMETRIAL ABLATION N/A 2022    Procedure: DILATATION AND CURETTAGE HYSTEROSCOPY NOVASURE ENDOMETRIAL ABLATION;  Surgeon: Magda Jean DO;  Location: [facility] MAIN OR;  Service: Gynecology;  Laterality: N/A;       Family History:  Family History   Problem Relation Age of Onset    Heart failure Mother     Breast cancer Sister 52    Heart failure Maternal Grandfather     Lung cancer Maternal Uncle     Colon cancer Neg Hx     Malig Hyperthermia Neg Hx     Ovarian cancer Neg Hx     Uterine cancer Neg Hx     Deep vein thrombosis Neg Hx     Pulmonary embolism Neg Hx        Social History:   reports that she has never smoked. She has never been exposed to tobacco smoke. She has never used smokeless tobacco. She reports that she does not currently use alcohol. She reports that she does not use drugs.    Medications:   Medications Prior to Admission   Medication Sig Dispense Refill Last Dose    Cholecalciferol 10 MCG (400 UNIT) tablet Take 1  tablet by mouth Daily.   Past Week    Probiotic Product (PROBIOTIC DAILY PO) Take 1 tablet by mouth Daily.   Past Week       Allergies:  Patient has no known allergies.        Objective     Weight 63.8 kg (140 lb 10.5 oz), not currently breastfeeding.    Physical Exam   Constitutional: Pt is oriented to person, place, and time and well-developed, well-nourished, and in no distress.   Mouth/Throat: Oropharynx is clear and moist.   Neck: Normal range of motion.   Cardiovascular: Normal rate, regular rhythm and normal heart sounds.    Pulmonary/Chest: Effort normal and breath sounds normal.   Abdominal: Soft. Nontender  Skin: Skin is warm and dry.   Psychiatric: Mood, memory, affect and judgment normal.     Assessment & Plan     Diagnosis:  Surveillance for colon polyps    Anticipated Surgical Procedure:  Colonoscopy    The risks, benefits, and alternatives of this procedure have been discussed with the patient or the responsible party- the patient understands and agrees to proceed.

## 2024-04-30 NOTE — ANESTHESIA POSTPROCEDURE EVALUATION
Patient: Shruthi Gabriel    Procedure Summary       Date: 04/30/24 Room / Location: [facility] ENDOSCOPY 4 / [facility] ENDOSCOPY    Anesthesia Start: 1259 Anesthesia Stop: 1330    Procedure: COLONOSCOPY WITH METYLENE BLUE IRRIGATION Diagnosis:       Personal history of colonic polyps      (Personal history of colonic polyps [Z86.010])    Surgeons: Iggy Hoskins MD Provider: Lizz Medrano CRNA    Anesthesia Type: general ASA Status: 2            Anesthesia Type: general    Vitals  Vitals Value Taken Time   /80 04/30/24 1345   Temp 36.1 °C (97 °F) 04/30/24 1344   Pulse 73 04/30/24 1345   Resp 16 04/30/24 1344   SpO2 100 % 04/30/24 1345   Vitals shown include unfiled device data.        Post Anesthesia Care and Evaluation    Post-procedure mental status: acceptable.  Pain management: satisfactory to patient    Airway patency: patent  Anesthetic complications: No anesthetic complications    Cardiovascular status: acceptable  Respiratory status: acceptable    Comments: Per chart review

## 2024-05-10 ENCOUNTER — TELEPHONE (OUTPATIENT)
Dept: GASTROENTEROLOGY | Facility: CLINIC | Age: 49
End: 2024-05-10
Payer: COMMERCIAL

## 2024-05-20 PROCEDURE — S0260 H&P FOR SURGERY: HCPCS | Performed by: OBSTETRICS & GYNECOLOGY

## 2024-05-20 NOTE — H&P
[facility]   HISTORY AND PHYSICAL    Patient Name:Shruthi Gabriel  : 1975  MRN: [id number]  Primary Care Physician: Lluvia Morrow APRN  Date of admission: (Not on file)    Subjective   Subjective     Chief Complaint: here for surgery    History of Present Illness   Shruthi Gabriel is a 49 y.o. female  with abnormal bleeding after endometrial ablation scheduled for [facility]. Pt has hx breast cancer which was estrogen receptor +. The patient is on tamoxifen to prevent recurrence.  If the patient misses her dose of tamoxifen she has vaginal bleeding. Since the patient has had an ablation, she is not a candidate for endometrial biopsy so she is therefore scheduled for hysterectomy.     Review of Systems   Constitutional: Negative.    HENT: Negative.     Eyes: Negative.    Respiratory: Negative.     Cardiovascular: Negative.    Gastrointestinal: Negative.    Endocrine: Negative.    Genitourinary:  Positive for vaginal bleeding.   Skin: Negative.    Allergic/Immunologic: Negative.    Neurological: Negative.    Hematological: Negative.    Psychiatric/Behavioral: Negative.           Personal History     Past Medical History:   Diagnosis Date    Abnormal uterine bleeding (AUB)     Breast cancer     remission    Family history of cardiac disorder     see's dr jacob. pt denied any cardiac hx just used md as preventive measures LAST SEEN , FOLLOWS UP PRN    Herpes     Menorrhagia     Palpitations     x1-  seen nidia, since family hx of cardiac problems. pt denied any problems at this time.    Ulcerative colitis        Past Surgical History:   Procedure Laterality Date    BREAST LUMPECTOMY Right     NO BP OR NEEDLE STICKS RIGHT SIDE     SECTION      COLONOSCOPY      COLONOSCOPY N/A 2022    Procedure: COLONOSCOPY SCREENING WITH INDIGO CARMINE FLUSH, POLYPECTOMY;  Surgeon: Iggy Hoskins MD;  Location: [facility] ENDOSCOPY;  Service: Gastroenterology;  Laterality: N/A;  COLON  POLYP    COLONOSCOPY N/A 4/30/2024    Procedure: COLONOSCOPY WITH METYLENE BLUE IRRIGATION;  Surgeon: Iggy Hoskins MD;  Location: [facility] ENDOSCOPY;  Service: Gastroenterology;  Laterality: N/A;  NORMAL    D & C HYSTEROSCOPY ENDOMETRIAL ABLATION N/A 02/22/2022    Procedure: DILATATION AND CURETTAGE HYSTEROSCOPY NOVASURE ENDOMETRIAL ABLATION;  Surgeon: Magda Jean DO;  Location: [facility] MAIN OR;  Service: Gynecology;  Laterality: N/A;       Family History: Her family history includes Breast cancer (age of onset: 52) in her sister; Heart failure in her maternal grandfather and mother; Lung cancer in her maternal uncle.     Social History: She  reports that she has never smoked. She has never been exposed to tobacco smoke. She has never used smokeless tobacco. She reports that she does not currently use alcohol. She reports that she does not use drugs.    Home Medications:  Probiotic Product    Allergies:  She has No Known Allergies.    Objective    Objective     Vitals:         Physical Exam  Vitals and nursing note reviewed. Exam conducted with a chaperone present.   Constitutional:       Appearance: Normal appearance.   HENT:      Head: Normocephalic.   Cardiovascular:      Rate and Rhythm: Normal rate and regular rhythm.   Pulmonary:      Effort: Pulmonary effort is normal.      Breath sounds: Normal breath sounds.   Abdominal:      General: Bowel sounds are normal.      Palpations: Abdomen is soft.   Genitourinary:     Comments: Nml female external genitalia.  Cervix is parous. Uterus axial normal size shape and consistency.  No adnexal masses are appreciated    Musculoskeletal:         General: Normal range of motion.      Cervical back: Normal range of motion.   Skin:     General: Skin is warm and dry.   Neurological:      General: No focal deficit present.      Mental Status: She is alert and oriented to person, place, and time.   Psychiatric:         Mood and Affect: Mood normal.         Behavior:  Behavior normal.          Result Review    Result Review:  I have personally reviewed the results from the time of this admission to 5/20/2024 17:05 EDT and agree with these findings:  []  Laboratory list / accordion  []  Microbiology  []  Radiology  []  EKG/Telemetry   []  Cardiology/Vascular   []  Pathology  []  Old records  []  Other:  Most notable findings include:       Assessment & Plan   Assessment / Plan     Brief Patient Summary:  Shruthi Gabriel is a 49 y.o. female here for hysterectomy    Active Hospital Problems:  Active Hospital Problems    Diagnosis     **Abnormal uterine bleeding (AUB)      Plan:   Total laparoscopic hysterectomy with bilateral salpingectomies, possible exploratory laparotomy with bilateral salpingo oophorectomy:The patient was counseled to the risks and benefits of this procedure to include infection, bleeding, damage to internal organs, bowels, bladder, blood vessels, other internal organs requiring additional surgery to repair or remove any damaged structures.  If there is damage to the bowels may require exploratory laparotomy with primary repair for bowel injury.  If there is damage to the bladder may require exploratory laparotomy with primary repair of bladder injury going home with an indwelling Macias catheter to be removed at a later date.  If there is damage to the ureters may require exploratory laparotomy with primary repair of ureteral injury going home with an indwelling ureteral stent to be removed at a later date.  Patient is to have complications that are recognized at the time of surgery may have a change in the original operation as well as the postoperative stay in the hospital but usually will make a full recovery.  Patient is to have complications that occur that are either not recognized or occur in the postoperative period may have a prolonged recovery period and sometimes an incomplete recovery.  The patient was given the opportunity to ask questions and her  questions were answered to her satisfaction.     DVT prophylaxis:  No DVT prophylaxis order currently exists.        Magda Jean, DO

## 2024-05-20 NOTE — PRE-PROCEDURE INSTRUCTIONS
PATIENT INSTRUCTED TO BE:    - NOTHING TO EAT AFTER MIDNIGHT OR CHEW, EXCEPT CAN HAVE CLEAR LIQUIDS 2 HOURS PRIOR TO SURGERY ARRIVAL TIME     - TO HOLD ALL VITAMINS, SUPPLEMENTS, NSAIDS FOR ONE WEEK PRIOR TO THEIR SURGICAL PROCEDURE    - DO NOT TAKE __------------_ 7 DAYS PRIOR TO PROCEDURE PER ANESTHESIA RECOMMENDATIONS/INSTRUCTIONS     - INSTRUCTED PT TO USE SURGICAL SOAP 1 TIME THE NIGHT PRIOR TO SURGERY OR THE AM OF SURGERY.   USE SOAP FROM NECK TO TOES AVOID THEIR FACE, HAIR, AND PRIVATE PARTS. INSTRUCTED NO LOTIONS, JEWELRY, PIERCINGS, OR DEODORANT DAY OF SURGERY    - IF DIABETIC, CHECK BLOOD GLUCOSE IF LESS THAN 70 OR HAVING SYMPTOMS CALL THE PREOP AREA FOR INSTRUCTIONS ON AM OF SURGERY ([phone])    -INSTRUCTED TO TAKE THE FOLLOWING MEDICATIONS THE DAY OF SURGERY:                 NONE       - DO NOT BRING ANY MEDICATIONS WITH YOU TO THE HOSPITAL THE DAY OF SURGERY, EXCEPT IF USE INHALERS. BRING INHALERS DAY OF SURGERY       - BRING CPAP OR BIPAP TO THE HOSPITAL ONLY IF ARE SPENDING THE NIGHT    - DO NOT SMOKE OR VAPE 24 HOURS PRIOR TO PROCEDURE PER ANESTHESIA REQUEST     -MAKE SURE YOU HAVE A RIDE HOME OR SOMEONE TO STAY WITH YOU THE DAY OF THE PROCEDURE AFTER YOU GO HOME    - FOLLOW ANY OTHER INSTRUCTIONS GIVEN TO YOU BY YOUR SURGEON'S OFFICE.     - PREADMISSION TESTING NURSE DENIZ JULIEN RN AT  [phone] IF HAVE ANY QUESTIONS     PATIENT PROVIDED THE NUMBER FOR PREOP SURGICAL DEPT IF HAD QUESTIONS AFTER HOURS PRIOR TO SURGERY ([phone] )  INFORMED PT IF NO ANSWER, LEAVE A MESSAGE AND SOMEONE WILL RETURN THEIR CALL       PATIENT VERBALIZED UNDERSTANDING

## 2024-05-21 ENCOUNTER — HOSPITAL ENCOUNTER (OUTPATIENT)
Facility: HOSPITAL | Age: 49
Setting detail: HOSPITAL OUTPATIENT SURGERY
Discharge: HOME | End: 2024-05-21
Attending: OBSTETRICS & GYNECOLOGY | Admitting: OBSTETRICS & GYNECOLOGY
Payer: COMMERCIAL

## 2024-05-21 ENCOUNTER — ANESTHESIA EVENT (OUTPATIENT)
Dept: PERIOP | Facility: HOSPITAL | Age: 49
End: 2024-05-21
Payer: COMMERCIAL

## 2024-05-21 ENCOUNTER — ANESTHESIA (OUTPATIENT)
Dept: PERIOP | Facility: HOSPITAL | Age: 49
End: 2024-05-21
Payer: COMMERCIAL

## 2024-05-21 VITALS
TEMPERATURE: 97.2 F | BODY MASS INDEX: 24.3 KG/M2 | OXYGEN SATURATION: 97 % | RESPIRATION RATE: 16 BRPM | DIASTOLIC BLOOD PRESSURE: 82 MMHG | HEIGHT: 63 IN | WEIGHT: 137.13 LBS | SYSTOLIC BLOOD PRESSURE: 132 MMHG | HEART RATE: 98 BPM

## 2024-05-21 DIAGNOSIS — Z85.3 HISTORY OF RIGHT BREAST CANCER: ICD-10-CM

## 2024-05-21 DIAGNOSIS — N93.9 ABNORMAL UTERINE BLEEDING (AUB): Primary | ICD-10-CM

## 2024-05-21 DIAGNOSIS — N64.4 BREAST PAIN: ICD-10-CM

## 2024-05-21 DIAGNOSIS — R19.00 PELVIC MASS: Primary | ICD-10-CM

## 2024-05-21 PROBLEM — N83.8 OVARIAN MASS, RIGHT: Status: ACTIVE | Noted: 2024-05-21

## 2024-05-21 PROBLEM — N73.6 PELVIC ADHESIVE DISEASE: Status: ACTIVE | Noted: 2024-05-21

## 2024-05-21 LAB
B-HCG UR QL: NEGATIVE
BASOPHILS # BLD AUTO: 0.06 10*3/MM3 (ref 0–0.2)
BASOPHILS NFR BLD AUTO: 0.9 % (ref 0–1.5)
CANCER AG125 SERPL QL: 57.3 U/ML (ref 0–38.1)
DEPRECATED RDW RBC AUTO: 42.5 FL (ref 37–54)
EOSINOPHIL # BLD AUTO: 0.19 10*3/MM3 (ref 0–0.4)
EOSINOPHIL NFR BLD AUTO: 2.7 % (ref 0.3–6.2)
ERYTHROCYTE [DISTWIDTH] IN BLOOD BY AUTOMATED COUNT: 12.2 % (ref 12.3–15.4)
HCT VFR BLD AUTO: 45 % (ref 34–46.6)
HGB BLD-MCNC: 14.7 G/DL (ref 12–15.9)
IMM GRANULOCYTES # BLD AUTO: 0.03 10*3/MM3 (ref 0–0.05)
IMM GRANULOCYTES NFR BLD AUTO: 0.4 % (ref 0–0.5)
LYMPHOCYTES # BLD AUTO: 1.95 10*3/MM3 (ref 0.7–3.1)
LYMPHOCYTES NFR BLD AUTO: 28.1 % (ref 19.6–45.3)
MCH RBC QN AUTO: 30.8 PG (ref 26.6–33)
MCHC RBC AUTO-ENTMCNC: 32.7 G/DL (ref 31.5–35.7)
MCV RBC AUTO: 94.3 FL (ref 79–97)
MONOCYTES # BLD AUTO: 0.57 10*3/MM3 (ref 0.1–0.9)
MONOCYTES NFR BLD AUTO: 8.2 % (ref 5–12)
NEUTROPHILS NFR BLD AUTO: 4.14 10*3/MM3 (ref 1.7–7)
NEUTROPHILS NFR BLD AUTO: 59.7 % (ref 42.7–76)
NRBC BLD AUTO-RTO: 0 /100 WBC (ref 0–0.2)
PLATELET # BLD AUTO: 340 10*3/MM3 (ref 140–450)
PMV BLD AUTO: 10 FL (ref 6–12)
RBC # BLD AUTO: 4.77 10*6/MM3 (ref 3.77–5.28)
WBC NRBC COR # BLD AUTO: 6.94 10*3/MM3 (ref 3.4–10.8)

## 2024-05-21 PROCEDURE — 81025 URINE PREGNANCY TEST: CPT | Performed by: ANESTHESIOLOGY

## 2024-05-21 PROCEDURE — 25010000002 MAGNESIUM SULFATE PER 500 MG OF MAGNESIUM: Performed by: NURSE ANESTHETIST, CERTIFIED REGISTERED

## 2024-05-21 PROCEDURE — 25010000002 SUGAMMADEX 200 MG/2ML SOLUTION: Performed by: NURSE ANESTHETIST, CERTIFIED REGISTERED

## 2024-05-21 PROCEDURE — 25010000002 DEXAMETHASONE PER 1 MG: Performed by: NURSE ANESTHETIST, CERTIFIED REGISTERED

## 2024-05-21 PROCEDURE — 25010000002 ONDANSETRON PER 1 MG: Performed by: NURSE ANESTHETIST, CERTIFIED REGISTERED

## 2024-05-21 PROCEDURE — 25010000002 PROPOFOL 10 MG/ML EMULSION: Performed by: NURSE ANESTHETIST, CERTIFIED REGISTERED

## 2024-05-21 PROCEDURE — 25010000002 MIDAZOLAM PER 1MG: Performed by: ANESTHESIOLOGY

## 2024-05-21 PROCEDURE — 25810000003 LACTATED RINGERS PER 1000 ML: Performed by: ANESTHESIOLOGY

## 2024-05-21 PROCEDURE — 25010000002 BUPIVACAINE (PF) 0.5 % SOLUTION: Performed by: OBSTETRICS & GYNECOLOGY

## 2024-05-21 PROCEDURE — 25010000002 CEFAZOLIN PER 500 MG: Performed by: OBSTETRICS & GYNECOLOGY

## 2024-05-21 PROCEDURE — 49320 DIAG LAPARO SEPARATE PROC: CPT | Performed by: OBSTETRICS & GYNECOLOGY

## 2024-05-21 PROCEDURE — 85025 COMPLETE CBC W/AUTO DIFF WBC: CPT | Performed by: OBSTETRICS & GYNECOLOGY

## 2024-05-21 PROCEDURE — 25010000002 FENTANYL CITRATE (PF) 50 MCG/ML SOLUTION: Performed by: NURSE ANESTHETIST, CERTIFIED REGISTERED

## 2024-05-21 PROCEDURE — 86304 IMMUNOASSAY TUMOR CA 125: CPT | Performed by: OBSTETRICS & GYNECOLOGY

## 2024-05-21 PROCEDURE — 25010000002 KETOROLAC TROMETHAMINE PER 15 MG: Performed by: NURSE ANESTHETIST, CERTIFIED REGISTERED

## 2024-05-21 RX ORDER — PHENAZOPYRIDINE HYDROCHLORIDE 200 MG/1
200 TABLET, FILM COATED ORAL
Status: COMPLETED | OUTPATIENT
Start: 2024-05-21 | End: 2024-05-21

## 2024-05-21 RX ORDER — ONDANSETRON 4 MG/1
4 TABLET, ORALLY DISINTEGRATING ORAL ONCE AS NEEDED
Status: DISCONTINUED | OUTPATIENT
Start: 2024-05-21 | End: 2024-05-21 | Stop reason: HOSPADM

## 2024-05-21 RX ORDER — MAGNESIUM SULFATE HEPTAHYDRATE 500 MG/ML
INJECTION, SOLUTION INTRAMUSCULAR; INTRAVENOUS AS NEEDED
Status: DISCONTINUED | OUTPATIENT
Start: 2024-05-21 | End: 2024-05-21 | Stop reason: SURG

## 2024-05-21 RX ORDER — PHENYLEPHRINE HCL IN 0.9% NACL 1 MG/10 ML
SYRINGE (ML) INTRAVENOUS AS NEEDED
Status: DISCONTINUED | OUTPATIENT
Start: 2024-05-21 | End: 2024-05-21 | Stop reason: SURG

## 2024-05-21 RX ORDER — SODIUM CHLORIDE, SODIUM LACTATE, POTASSIUM CHLORIDE, CALCIUM CHLORIDE 600; 310; 30; 20 MG/100ML; MG/100ML; MG/100ML; MG/100ML
9 INJECTION, SOLUTION INTRAVENOUS CONTINUOUS PRN
Status: DISCONTINUED | OUTPATIENT
Start: 2024-05-21 | End: 2024-05-21 | Stop reason: HOSPADM

## 2024-05-21 RX ORDER — ONDANSETRON 2 MG/ML
INJECTION INTRAMUSCULAR; INTRAVENOUS AS NEEDED
Status: DISCONTINUED | OUTPATIENT
Start: 2024-05-21 | End: 2024-05-21 | Stop reason: SURG

## 2024-05-21 RX ORDER — FENTANYL CITRATE 50 UG/ML
INJECTION, SOLUTION INTRAMUSCULAR; INTRAVENOUS AS NEEDED
Status: DISCONTINUED | OUTPATIENT
Start: 2024-05-21 | End: 2024-05-21 | Stop reason: SURG

## 2024-05-21 RX ORDER — PROMETHAZINE HYDROCHLORIDE 12.5 MG/1
25 TABLET ORAL ONCE AS NEEDED
Status: DISCONTINUED | OUTPATIENT
Start: 2024-05-21 | End: 2024-05-21 | Stop reason: HOSPADM

## 2024-05-21 RX ORDER — DEXMEDETOMIDINE HYDROCHLORIDE 100 UG/ML
INJECTION, SOLUTION INTRAVENOUS AS NEEDED
Status: DISCONTINUED | OUTPATIENT
Start: 2024-05-21 | End: 2024-05-21 | Stop reason: SURG

## 2024-05-21 RX ORDER — ROCURONIUM BROMIDE 10 MG/ML
INJECTION, SOLUTION INTRAVENOUS AS NEEDED
Status: DISCONTINUED | OUTPATIENT
Start: 2024-05-21 | End: 2024-05-21 | Stop reason: SURG

## 2024-05-21 RX ORDER — OXYCODONE HYDROCHLORIDE 5 MG/1
5 TABLET ORAL
Status: DISCONTINUED | OUTPATIENT
Start: 2024-05-21 | End: 2024-05-21 | Stop reason: HOSPADM

## 2024-05-21 RX ORDER — MEPERIDINE HYDROCHLORIDE 25 MG/ML
12.5 INJECTION INTRAMUSCULAR; INTRAVENOUS; SUBCUTANEOUS
Status: DISCONTINUED | OUTPATIENT
Start: 2024-05-21 | End: 2024-05-21 | Stop reason: HOSPADM

## 2024-05-21 RX ORDER — EPHEDRINE SULFATE 50 MG/ML
INJECTION INTRAVENOUS AS NEEDED
Status: DISCONTINUED | OUTPATIENT
Start: 2024-05-21 | End: 2024-05-21 | Stop reason: SURG

## 2024-05-21 RX ORDER — IBUPROFEN 600 MG/1
600 TABLET ORAL EVERY 6 HOURS PRN
Status: DISCONTINUED | OUTPATIENT
Start: 2024-05-21 | End: 2024-05-21 | Stop reason: HOSPADM

## 2024-05-21 RX ORDER — IBUPROFEN 600 MG/1
600 TABLET ORAL EVERY 6 HOURS PRN
Qty: 30 TABLET | Refills: 1 | Status: SHIPPED | OUTPATIENT
Start: 2024-05-21

## 2024-05-21 RX ORDER — ACETAMINOPHEN 500 MG
1000 TABLET ORAL ONCE
Status: COMPLETED | OUTPATIENT
Start: 2024-05-21 | End: 2024-05-21

## 2024-05-21 RX ORDER — PROPOFOL 10 MG/ML
VIAL (ML) INTRAVENOUS AS NEEDED
Status: DISCONTINUED | OUTPATIENT
Start: 2024-05-21 | End: 2024-05-21 | Stop reason: SURG

## 2024-05-21 RX ORDER — PROMETHAZINE HYDROCHLORIDE 25 MG/1
25 SUPPOSITORY RECTAL ONCE AS NEEDED
Status: DISCONTINUED | OUTPATIENT
Start: 2024-05-21 | End: 2024-05-21 | Stop reason: HOSPADM

## 2024-05-21 RX ORDER — MAGNESIUM HYDROXIDE 1200 MG/15ML
LIQUID ORAL AS NEEDED
Status: DISCONTINUED | OUTPATIENT
Start: 2024-05-21 | End: 2024-05-21 | Stop reason: HOSPADM

## 2024-05-21 RX ORDER — MIDAZOLAM HYDROCHLORIDE 2 MG/2ML
2 INJECTION, SOLUTION INTRAMUSCULAR; INTRAVENOUS ONCE
Status: COMPLETED | OUTPATIENT
Start: 2024-05-21 | End: 2024-05-21

## 2024-05-21 RX ORDER — ONDANSETRON 2 MG/ML
4 INJECTION INTRAMUSCULAR; INTRAVENOUS ONCE AS NEEDED
Status: DISCONTINUED | OUTPATIENT
Start: 2024-05-21 | End: 2024-05-21 | Stop reason: HOSPADM

## 2024-05-21 RX ORDER — OXYCODONE HYDROCHLORIDE 5 MG/1
5-10 TABLET ORAL EVERY 8 HOURS PRN
Qty: 10 TABLET | Refills: 0 | Status: SHIPPED | OUTPATIENT
Start: 2024-05-21

## 2024-05-21 RX ORDER — KETOROLAC TROMETHAMINE 30 MG/ML
INJECTION, SOLUTION INTRAMUSCULAR; INTRAVENOUS AS NEEDED
Status: DISCONTINUED | OUTPATIENT
Start: 2024-05-21 | End: 2024-05-21 | Stop reason: SURG

## 2024-05-21 RX ORDER — BUPIVACAINE HYDROCHLORIDE 5 MG/ML
INJECTION, SOLUTION EPIDURAL; INTRACAUDAL AS NEEDED
Status: DISCONTINUED | OUTPATIENT
Start: 2024-05-21 | End: 2024-05-21 | Stop reason: HOSPADM

## 2024-05-21 RX ORDER — ACETAMINOPHEN 325 MG/1
650 TABLET ORAL EVERY 4 HOURS PRN
Qty: 30 TABLET | Refills: 1 | Status: SHIPPED | OUTPATIENT
Start: 2024-05-21 | End: 2025-05-21

## 2024-05-21 RX ORDER — LIDOCAINE HYDROCHLORIDE 20 MG/ML
INJECTION, SOLUTION EPIDURAL; INFILTRATION; INTRACAUDAL; PERINEURAL AS NEEDED
Status: DISCONTINUED | OUTPATIENT
Start: 2024-05-21 | End: 2024-05-21 | Stop reason: SURG

## 2024-05-21 RX ORDER — DEXAMETHASONE SODIUM PHOSPHATE 4 MG/ML
INJECTION, SOLUTION INTRA-ARTICULAR; INTRALESIONAL; INTRAMUSCULAR; INTRAVENOUS; SOFT TISSUE AS NEEDED
Status: DISCONTINUED | OUTPATIENT
Start: 2024-05-21 | End: 2024-05-21 | Stop reason: SURG

## 2024-05-21 RX ADMIN — LIDOCAINE HYDROCHLORIDE 60 MG: 20 INJECTION, SOLUTION INTRAVENOUS at 07:29

## 2024-05-21 RX ADMIN — PROPOFOL 150 MG: 10 INJECTION, EMULSION INTRAVENOUS at 07:29

## 2024-05-21 RX ADMIN — FENTANYL CITRATE 50 MCG: 50 INJECTION, SOLUTION INTRAMUSCULAR; INTRAVENOUS at 08:22

## 2024-05-21 RX ADMIN — PHENAZOPYRIDINE HYDROCHLORIDE 200 MG: 200 TABLET ORAL at 07:14

## 2024-05-21 RX ADMIN — MIDAZOLAM HYDROCHLORIDE 2 MG: 1 INJECTION, SOLUTION INTRAMUSCULAR; INTRAVENOUS at 07:15

## 2024-05-21 RX ADMIN — DEXAMETHASONE SODIUM PHOSPHATE 8 MG: 4 INJECTION, SOLUTION INTRAMUSCULAR; INTRAVENOUS at 07:33

## 2024-05-21 RX ADMIN — SODIUM CHLORIDE 2 G: 9 INJECTION, SOLUTION INTRAVENOUS at 07:24

## 2024-05-21 RX ADMIN — Medication 100 MCG: at 07:35

## 2024-05-21 RX ADMIN — FENTANYL CITRATE 50 MCG: 50 INJECTION, SOLUTION INTRAMUSCULAR; INTRAVENOUS at 07:24

## 2024-05-21 RX ADMIN — SODIUM CHLORIDE, POTASSIUM CHLORIDE, SODIUM LACTATE AND CALCIUM CHLORIDE 9 ML/HR: 600; 310; 30; 20 INJECTION, SOLUTION INTRAVENOUS at 07:14

## 2024-05-21 RX ADMIN — EPHEDRINE SULFATE 5 MG: 50 INJECTION INTRAVENOUS at 08:09

## 2024-05-21 RX ADMIN — ACETAMINOPHEN 1000 MG: 500 TABLET ORAL at 07:14

## 2024-05-21 RX ADMIN — DEXMEDETOMIDINE 8 MCG: 100 INJECTION, SOLUTION INTRAVENOUS at 07:25

## 2024-05-21 RX ADMIN — ROCURONIUM BROMIDE 50 MG: 10 INJECTION, SOLUTION INTRAVENOUS at 07:29

## 2024-05-21 RX ADMIN — ONDANSETRON HYDROCHLORIDE 4 MG: 2 SOLUTION INTRAMUSCULAR; INTRAVENOUS at 08:27

## 2024-05-21 RX ADMIN — EPHEDRINE SULFATE 10 MG: 50 INJECTION INTRAVENOUS at 07:47

## 2024-05-21 RX ADMIN — PROPOFOL 75 MCG/KG/MIN: 10 INJECTION, EMULSION INTRAVENOUS at 07:33

## 2024-05-21 RX ADMIN — Medication 100 MCG: at 07:59

## 2024-05-21 RX ADMIN — KETOROLAC TROMETHAMINE 30 MG: 30 INJECTION, SOLUTION INTRAMUSCULAR; INTRAVENOUS at 08:42

## 2024-05-21 RX ADMIN — SUGAMMADEX 200 MG: 100 INJECTION, SOLUTION INTRAVENOUS at 08:42

## 2024-05-21 RX ADMIN — MAGNESIUM SULFATE HEPTAHYDRATE 1 G: 500 INJECTION, SOLUTION INTRAMUSCULAR; INTRAVENOUS at 08:22

## 2024-05-21 NOTE — OP NOTE
Diagnostic Laparoscopy  Procedure Report    Patient Name:  Shruthi Gabriel  YOB: 1975    Date of Surgery:  5/21/2024     Indications:  Abnormal uterine bleeding    Pre-op Diagnosis:   Abnormal uterine bleeding (AUB) [N93.9]       Post-Op Diagnosis Codes:     * Abnormal uterine bleeding (AUB) [N93.9]  Broad Ligament Fibroid  Pelvic Adhesive Disease  Right endometrioma    Procedure/CPT® Codes:      Procedure(s):  Diagnostic Laparoscopy    Staff:  Surgeon(s):  Magda Jean DO           Anesthesia: General    Estimated Blood Loss: 5 mL    Implants:    Nothing was implanted during the procedure    Specimen:          None        Findings: Uterus enlarged 8-10 week size. Large 6 cm ?right broad ligament fibroid. Right ovary enlarged not able to see in it's entirety as fixed in cul de sac. Left tube and ovary visualized and appear nml but adhered to posterior uterus and cul de sac. Anterior surface of uterus clear. Left ovary normal. Appendix normal. Liver and gall bladder normal.    Complications: none    Description of Procedure: After the appropriate consents had been obtained, the patient was taken  to the operative suite where she was placed in supine position and  underwent general endotracheal intubation.  After an adequate level of  anesthesia been obtained, the patient was placed in the low lithotomy  position and prepped and draped in usual sterile fashion.  Exam under  anesthesia revealed an axial enlarged uterus.  No adnexal masses were appreciated.  A speculum was placed in the vagina to view the cervix and the anterior  lip of the cervix was grasped with a single-tooth tenaculum.  I attempted to dilate the cervix and I was unable. The patient had had a previous endometrial ablation. I decided to look above and then look from above as I tried to dilate the cervix and place the ANCELMO manipulator so as not to perforate the uterus.  The speculum and single-tooth  tenaculum were removed.  A  Macias catheter was placed with drainage of  clear urine.  Attention was now turned towards the abdominal portion of  the procedure.  A supraumbilical incision was made after the skin had been  anesthetized with half percent Marcaine.  The Veress needle was introduced  into the abdominal cavity and correct place was confirmed using water  saline test.  A pneumoperitoneum was then created using carbon dioxide  gas.  After an adequate pneumoperitoneum had been created, the Veress  needle was removed.  The laparoscope and the 5 mm trocar and sleeve was  introduced into the abdominal cavity under direct visualization.  The  trocar was removed and the laparoscope was reinserted.  The patient was  placed in steep Trendelenburg.  The above findings were noted.   A second  incision was made in the left middle quadrant using landmarks of 3 cm  below the umbilicus and 8 cm to the left of the umbilicus.  The skin was  anesthetized with half percent Marcaine and a 5 mm trocar and sleeve was  introduced under direct visualization.  The trocar was removed.  A probe was  introduced through the left middle quadrant port and the above findings  were confirmed.  The procedure was terminated.  The gas was allowed to escape from the abdomen  and the instruments were removed.  The skin was reapproximated in a  subcuticular fashion using suture of 4-0 Monocryl.  Dermabond dressing was  placed.  The Macias catheter was removed and the single toothed tenaculum were  removed.  The patient was taken out of the lithotomy position awakened,  extubated and transferred to the recovery room in stable and satisfactory  condition.  The sponge count instrument counts were verified as correct.             Magda Jean DO     Date: 5/21/2024  Time: 13:05 EDT

## 2024-05-21 NOTE — ANESTHESIA POSTPROCEDURE EVALUATION
Patient: Shruthi Gabriel    Procedure Summary       Date: 05/21/24 Room / Location: [facility] OR  / [facility] MAIN OR    Anesthesia Start: 0724 Anesthesia Stop: 0857    Procedure: Diagnostic Laparoscopy (Bilateral: Abdomen) Diagnosis:       Abnormal uterine bleeding (AUB)      (Abnormal uterine bleeding (AUB) [N93.9])    Surgeons: Magda Jean DO Provider: Morgan Holguin MD    Anesthesia Type: general ASA Status: 2            Anesthesia Type: general    Vitals  Vitals Value Taken Time   /70 05/21/24 0921   Temp 36.1 °C (97 °F) 05/21/24 0915   Pulse 84 05/21/24 0922   Resp 15 05/21/24 0915   SpO2 99 % 05/21/24 0922   Vitals shown include unfiled device data.        Post Anesthesia Care and Evaluation    Patient location during evaluation: bedside  Patient participation: complete - patient participated  Level of consciousness: awake  Pain score: 2  Pain management: adequate    Airway patency: patent  PONV Status: none  Cardiovascular status: acceptable and stable  Respiratory status: acceptable  Hydration status: acceptable

## 2024-05-21 NOTE — DISCHARGE INSTRUCTIONS
DISCHARGE INSTRUCTIONS  GYNECOLOGICAL  PROCEDURES      For your surgery you had:  General anesthesia (you may have a sore throat for the first 24 hours)  IV sedation  Local anesthesia  Monitored anesthesia care  You received a medicated patch for nausea prevention today (behind your ear). It is recommended that you remove it 24-48 hours post-operatively. It must be removed within 72 hours.  You received an anesthesia medication today that can cause hormonal forms of birth control to be ineffective. You should use a different form of birth control (to prevent pregnancy) for 7 days.   You may experience dizziness, drowsiness, or lightheadedness for several hours following surgery.  Do not stay alone today or tonight.  Limit your activity for 24 hours.  Resume your diet slowly.  Follow any special dietary instructions you may have been given by your doctor.  You should not drive or operate machinery, drink alcohol, or sign legally binding documents for 24 hours or while you are taking pain medication.    Last dose of pain medication was given at: tylenol 1000 mg at 07:14 am-may repeat tylenol if needed at 11:14 am or later. DO NOT exceed 4000 mg of tylenol within 24 hours. Pyridium at 07:14 am-urine may be orange colored today. Toradol 30 mg at 08:42 am- may repeat ibuprofen at 2:42 pm or later. May take Oxycodone at any time if needed. .  NOTIFY YOUR DOCTOR IF YOU EXPERIENCE ANY OF THE FOLLOWING:  Temperature greater than 101 degrees Fahrenheit  Shaking Chills  Redness or excessive drainage from incision  Nausea, vomiting and/or pain that is not controlled by prescribed medications  Increase in bleeding or bleeding that is excessive  Unable to urinate in 6 hours after surgery  If unable to reach your doctor, please go to the closest Emergency Room   [] You may resume intercourse and the use of tampons as your physician has instructed you.  [] Nothing in the vagina for  5 weeks to include intercourse, douches, or  tampons.  [] Vaginal bleeding may be expected for several days with flow decreasing with time and never any heavier than a normal   period.  If you have an ablation, vaginal discharge is expected after bleeding stops.   If you have foul smelling discharge, notify your physician.  Medications per physician instructions as indicated on Discharge Medication Information Sheet.  You should see   for follow-up care   on   .  Phone number:      SPECIAL INSTRUCTIONS:

## 2024-05-21 NOTE — ANESTHESIA PREPROCEDURE EVALUATION
Anesthesia Evaluation     Patient summary reviewed and Nursing notes reviewed   no history of anesthetic complications:   NPO Solid Status: > 8 hours  NPO Liquid Status: > 2 hours           Airway   Mallampati: III  TM distance: >3 FB  Neck ROM: full  No difficulty expected  Dental      Pulmonary - negative pulmonary ROS and normal exam    breath sounds clear to auscultation  Cardiovascular - negative cardio ROS and normal exam  Exercise tolerance: good (4-7 METS)    ECG reviewed  Rhythm: regular  Rate: normal        Neuro/Psych- negative ROS  GI/Hepatic/Renal/Endo - negative ROS     Musculoskeletal (-) negative ROS    Abdominal    Substance History - negative use     OB/GYN negative ob/gyn ROS         Other      history of cancer    ROS/Med Hx Other: PAT Nursing Notes unavailable.               Anesthesia Plan    ASA 2     general     (Patient understands anesthesia not responsible for dental damage.)  intravenous induction     Anesthetic plan, risks, benefits, and alternatives have been provided, discussed and informed consent has been obtained with: patient.    Use of blood products discussed with patient .    Plan discussed with CRNA.    CODE STATUS:

## 2024-05-22 ENCOUNTER — TELEPHONE (OUTPATIENT)
Dept: SURGERY | Facility: CLINIC | Age: 49
End: 2024-05-22
Payer: COMMERCIAL

## 2024-05-22 DIAGNOSIS — N64.4 MASTODYNIA: Primary | ICD-10-CM

## 2024-05-22 NOTE — TELEPHONE ENCOUNTER
New patient chart prepared for Dr Cristine Ramirez, [id number] records requested from referring office and [facility].

## 2024-05-22 NOTE — TELEPHONE ENCOUNTER
LVM for patient to call back to discuss possibly having the dx imaging done sooner at the [facility] location due to the 1st available at [place] would be 06/13 and with the NP Moris being out for vacation the following week.  Gave direct number for the patient to callback.    Also reached out to Mammography at the [place] location to give a call if a cancellation for a sooner date becomes available.

## 2024-05-22 NOTE — TELEPHONE ENCOUNTER
Called [facility] and left 2 vm for Jessica to call back and get the patient scheduled for a MRI & US of the breast (05/21).    Attempted to reach the mammography dept again and no response, lvjese again (05/22).    Will try again later today.

## 2024-06-03 ENCOUNTER — TELEPHONE (OUTPATIENT)
Dept: OBSTETRICS AND GYNECOLOGY | Facility: CLINIC | Age: 49
End: 2024-06-03
Payer: COMMERCIAL

## 2024-06-04 NOTE — TELEPHONE ENCOUNTER
Patient did not need appointment. Surgery will be in [place]. Cancelled June 24th appointment as well.

## 2024-06-07 ENCOUNTER — HOSPITAL ENCOUNTER (OUTPATIENT)
Dept: MAMMOGRAPHY | Facility: HOSPITAL | Age: 49
Discharge: HOME | End: 2024-06-07
Payer: COMMERCIAL

## 2024-06-07 ENCOUNTER — HOSPITAL ENCOUNTER (OUTPATIENT)
Dept: ULTRASOUND IMAGING | Facility: HOSPITAL | Age: 49
Discharge: HOME | End: 2024-06-07
Payer: COMMERCIAL

## 2024-06-07 DIAGNOSIS — N64.4 BREAST PAIN, LEFT: ICD-10-CM

## 2024-06-07 PROCEDURE — 76642 ULTRASOUND BREAST LIMITED: CPT

## 2024-06-07 PROCEDURE — G0279 TOMOSYNTHESIS, MAMMO: HCPCS

## 2024-06-07 PROCEDURE — 77066 DX MAMMO INCL CAD BI: CPT

## 2024-06-11 ENCOUNTER — TELEPHONE (OUTPATIENT)
Dept: OBSTETRICS AND GYNECOLOGY | Facility: CLINIC | Age: 49
End: 2024-06-11
Payer: COMMERCIAL

## 2024-06-11 NOTE — TELEPHONE ENCOUNTER
Evelin @ [facility] called this am.  She stated you had been trying to reach Ms Gabriel PCP. Jessica phone# [phone]

## 2024-06-11 NOTE — PROGRESS NOTES
[facility]     Referring Provider: Dr Jean     Chief complaint:  History of breast cancer and breast pain     HPI: Ms. Shruthi Gabriel is a 50yo woman, seen at the request of No Known Provider, for breast pain and history of right breast cancer    I personally reviewed her records and summarized her relevant breast history/imaging:    3/10/2021 bilateral screening mammogram at [facility]  Breasts are extremely dense.  BI-RADS 1    4/27/2021 bilateral breast MRI at [facility]  Right breast:  Moderate background parenchymal enhancement.  0.6 cm enhancing mass superior to the     nipple line in the far right lateral breast has early medium uptake and delayed washout kinetics     (image [id number]).  Signal characteristics do not definitively confirm lymph node.  No axillary     or internal mammary chain adenopathy.  No suspicious skin or nipple enhancement.    Left breast:  No suspicious mass or non mass enhancement stands out over the moderate background     parenchymal enhancement.  No morphologically suspicious lesions.  No axillary or internal mammary     chain adenopathy.  No suspicious nipple or skin lesions.    No suspicious chest wall or pectoralis enhancement.    IMPRESSION:    Right breast:  0.6 cm enhancing mass in the far lateral central right breast superior to the nipple     line.  Recommend second-look ultrasound.   Left breast:  No MRI evidence of malignancy.    BIRADS:    DIAGNOSTIC CATEGORY 0    5/13/2021 right Limited breast ultrasound at [facility]  Corresponding to the mass in the far outer, central right breast on MRI, there is a 6 x 4 x 5 mm     hypoechoic mass with indistinct margins at 09:30, 9 cm from the nipple.    CONCLUSION:        Recommend ultrasound-guided biopsy of right breast mass.      I discussed results with the patient following the exam.  She will be scheduled for biopsy, and our     staff will notify the ordering physician's  office.    RECOMMENDATION(S):      ULTRASOUND-GUIDED CORE BIOPSY: RIGHT BREAST      BIRADS:      DIAGNOSTIC CATEGORY 4    6/4/2021 ultrasound-guided breast biopsy at [facility]  Technically successful ultrasound guided core biopsy of an irregular 0.6 cm mass 9:30     hours 9 cm from the nipple of the right breast. The patient tolerated the procedure well without     immediate complication. Specimens have been sent to pathology for further analysis.  Addendum will     be dictated upon receiving final pathology for concordance and recommendations.    Pathology  Invasive carcinoma of no fascial type ductal  Histologic grade  Score 2  ER positive, KY positive, HER2 equivocal, Ki-67 20%, FISH positive    8/29/2022 bilateral diagnostic mammogram at [facility]  Breasts are heterogeneously dense.  Posttreatment change in the right breast.  No evidence of malignancy.  BI-RADS 2    3/15/2023 bilateral MRI of the breast at [facility]  Postsurgical change from prior segmental mastectomy in the lateral aspect of the right breast.  There is no morphologically or genetically suspicious finding within the right breast.  Right enhancing axillary lymph node appears morphologically normal with thin concentric cortices and preserved fat augusta.  No morphologically abnormal appearing lymph node is seen.  Left there is no morphologically or clinically suspicious findings of the left breast.  Impression no MRI evidence malignancy in either breast.  BI-RADS 2    9/11/2023 bilateral diagnostic mammogram at [facility]  Scattered areas of fibroglandular density  No evidence of malignancy.  BI-RADS 2    6/7/2024 bilateral diagnostic mammogram and bilateral limited breast ultrasound at [facility]  The mammogram has a stable appearance. No suspicious masses, areas of  architectural distortion or suspicious microcalcifications are  identified in either breast.  Focused ultrasound  examination of the 3 o'clock position of the left  breast 10 cm from the nipple in the area of pain described the patient  demonstrates normal parenchyma. No suspicious masses or areas of  abnormal shadowing are identified. Focused ultrasound examination of the  10 o'clock position of the right breast 9 cm from the nipple  demonstrates a 4 mm round hypoechoic mass.  The mammographic images were interpreted with the assistance of a  computer aided detection system.  The patient's information is entered into a computerized reminder system  with a targeted due date for the next mammogram.  IMPRESSION:  OVERALL IMPRESSION:  1. Right breast: 4 mm indeterminate mass 10 o'clock position 9 cm from  the nipple. Recommend ultrasound-guided biopsy.  2. Left breast: Benign mammogram and focused left breast ultrasound.  BI-RADS ASSESSMENT: BI-RADS 4    She has a personal history of 2021 right breast invasive ductal carcinoma, ER/RI positive HER2 equivocal FISH positive Ki-67 20%, intermittent grade ductal carcinoma in situ, lumpectomy with negative sentinel node biopsy, radiation, tamoxifen  x 2 years.  Stopped taking 8-2023 due to side effects.     She has a family history of breast cancer in her sister dx age 52. 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 She was due for MRI in March 2024 but she did not have it. She also has a history of right breast cancer.  She has been in high risk screening since the cancer diagnosis due to not finding the cancer by mammogram.   She is scheduled for a total hysterectomy tomorrow by Shoaib Kerns for increased bleeding and pain.   She denies any breast lumps, pain, skin changes, or nipple discharge.         Review of Systems -  Oncology    Medications:    Current Outpatient Medications:     acetaminophen (Tylenol) 325 MG tablet, Take 2 tablets by mouth Every 4 (Four) Hours As Needed for Moderate Pain., Disp: 30 tablet, Rfl: 1    ibuprofen (ADVIL,MOTRIN) 600 MG tablet, Take 1 tablet by mouth Every 6 (Six) Hours As Needed for Mild Pain., Disp: 30 tablet, Rfl: 1    Probiotic Product (PROBIOTIC DAILY PO), Take 1 tablet by mouth Daily., Disp: , Rfl:     Allergies:  No Known Allergies    Medical history:  Past Medical History:   Diagnosis Date    Abnormal uterine bleeding (AUB)     Breast cancer     remission    Family history of cardiac disorder     see's dr jacob. pt denied any cardiac hx just used md as preventive measures LAST SEEN , FOLLOWS UP PRN    Herpes     Menorrhagia     Palpitations     x1-  seen nidia, since family hx of cardiac problems. pt denied any problems at this time.    Ulcerative colitis        Surgical History:  Past Surgical History:   Procedure Laterality Date    BREAST BIOPSY Right     BREAST LUMPECTOMY Right     NO BP OR NEEDLE STICKS RIGHT SIDE     SECTION      COLONOSCOPY      COLONOSCOPY N/A 2022    Procedure: COLONOSCOPY SCREENING WITH INDIGO CARMINE FLUSH, POLYPECTOMY;  Surgeon: Iggy Hoskins MD;  Location: [facility] ENDOSCOPY;  Service: Gastroenterology;  Laterality: N/A;  COLON POLYP    COLONOSCOPY N/A 2024    Procedure: COLONOSCOPY WITH METYLENE BLUE IRRIGATION;  Surgeon: Iggy Hosknis MD;  Location: [facility] ENDOSCOPY;  Service: Gastroenterology;  Laterality: N/A;  NORMAL    D & C HYSTEROSCOPY ENDOMETRIAL ABLATION N/A 2022    Procedure: DILATATION AND CURETTAGE HYSTEROSCOPY NOVASURE ENDOMETRIAL ABLATION;  Surgeon: Magda Jean DO;  Location: [facility] OR;  Service: Gynecology;  Laterality: N/A;    TOTAL LAPAROSCOPIC HYSTERECTOMY Bilateral 2024    Procedure: Diagnostic Laparoscopy;  Surgeon: Magda Jean DO;  Location: [facility]  OR;  Service: Gynecology;  Laterality: Bilateral;       Family History:  Family History   Problem Relation Age of Onset    Heart failure Mother     Breast cancer Sister 52    Heart failure Maternal Grandfather     Lung cancer Maternal Uncle     Colon cancer Neg Hx     Malig Hyperthermia Neg Hx     Ovarian cancer Neg Hx     Uterine cancer Neg Hx     Deep vein thrombosis Neg Hx     Pulmonary embolism Neg Hx        Social History:   Social History     Socioeconomic History    Marital status:    Tobacco Use    Smoking status: Never     Passive exposure: Never    Smokeless tobacco: Never   Vaping Use    Vaping status: Never Used   Substance and Sexual Activity    Alcohol use: Not Currently    Drug use: Never    Sexual activity: Defer     Birth control/protection: Vasectomy     Patient drinks 1-2 servings of caffeine per day.       GYNECOLOGIC HISTORY:   . P: 1. AB: 0.  Last menstrual period: 2024  Age at menarche: 11  Age at first childbirth: 39  Lactation/How lon months   Age at menopause: N/A  Total years of oral contraceptive use: 15 years  Total years of hormone replacement therapy: N/A      Physical Exam  Vitals:    24 1106   Pulse: 73   SpO2: 98%     ECOG 0 - Asymptomatic  General: NAD, well appearing  Psych: a&o x 3, normal mood and affect  Eyes: EOMI, no scleral icterus  ENMT: neck supple without masses or thyromegaly, mucus membranes moist  Resp: normal effort, CTAB  CV: RRR, no murmurs, no edema   GI: soft, NT, ND  MSK: normal gait, normal ROM in bilateral shoulders  Lymph nodes: no cervical, supraclavicular or axillary lymphadenopathy  Breast: symmetric, medium  Right: No visible abnormalities on inspection while seated, with arms raised or hands on hips. No masses, skin changes, or nipple abnormalities. 9:00 11cmfn 6cm scar from previous lumpectomy  Left: No visible abnormalities on inspection while seated, with arms raised or hands on hips. No masses, skin changes, or nipple  abnormalities.      Assessment:    History of breast cancer  Breast pain  Abnormal breast imaging    Discussion:  Discussed ultrasound guided breast biopsy at length    Plan:  Right ultrasound-guided breast biopsy in the near future we will call with results  He is overdue for her MRI but we will hold on this until we get the biopsy results back  We will continue her in high risk screening if biopsy is normal        Iram Avalos MA    I have spent 40 mins in face to face time with the patient and in chart review.    CC:  No Known Provider  Lluvia Morrow APRN    EMR Dragon/transcription disclaimer:  Dictated using Dragon dictation

## 2024-06-12 ENCOUNTER — TELEPHONE (OUTPATIENT)
Dept: SURGERY | Facility: CLINIC | Age: 49
End: 2024-06-12
Payer: COMMERCIAL

## 2024-06-12 ENCOUNTER — PREP FOR SURGERY (OUTPATIENT)
Dept: OTHER | Facility: HOSPITAL | Age: 49
End: 2024-06-12
Payer: COMMERCIAL

## 2024-06-12 ENCOUNTER — OFFICE VISIT (OUTPATIENT)
Dept: SURGERY | Facility: CLINIC | Age: 49
End: 2024-06-12
Payer: COMMERCIAL

## 2024-06-12 VITALS
DIASTOLIC BLOOD PRESSURE: 72 MMHG | HEART RATE: 73 BPM | OXYGEN SATURATION: 98 % | SYSTOLIC BLOOD PRESSURE: 132 MMHG | BODY MASS INDEX: 24.8 KG/M2 | WEIGHT: 140 LBS | HEIGHT: 63 IN

## 2024-06-12 DIAGNOSIS — Z17.0 MALIGNANT NEOPLASM OF UPPER-OUTER QUADRANT OF RIGHT BREAST IN FEMALE, ESTROGEN RECEPTOR POSITIVE: Primary | ICD-10-CM

## 2024-06-12 DIAGNOSIS — C50.411 MALIGNANT NEOPLASM OF UPPER-OUTER QUADRANT OF RIGHT BREAST IN FEMALE, ESTROGEN RECEPTOR POSITIVE: Primary | ICD-10-CM

## 2024-06-12 DIAGNOSIS — R92.8 ABNORMAL MAMMOGRAM: ICD-10-CM

## 2024-06-12 DIAGNOSIS — Z91.89 AT HIGH RISK FOR BREAST CANCER: ICD-10-CM

## 2024-06-12 DIAGNOSIS — R92.8 ABNORMAL MAMMOGRAM: Primary | ICD-10-CM

## 2024-06-12 NOTE — TELEPHONE ENCOUNTER
Called [facility] [phone].  Evelin was out of the office I left message with Viky that Dr Jean & Susan had both stated they had not tried to get ahold of Jessica.

## 2024-06-12 NOTE — TELEPHONE ENCOUNTER
Spoke to pt and got her tim for her breast BX at [place] on Monday 06/17 be there no later then 1030  Wear a supportive bra and eat something light   She will go through entrance A   Pt stated understanding    Pt knows brandi will be out next week I told her I will be on the look out for her bx results

## 2024-06-14 ENCOUNTER — PATIENT ROUNDING (BHMG ONLY) (OUTPATIENT)
Dept: SURGERY | Facility: CLINIC | Age: 49
End: 2024-06-14
Payer: COMMERCIAL

## 2024-06-14 NOTE — PROGRESS NOTES
June 14, 2024    Hello, may I speak with Shruthi Gabriel?    My name is Beverly      I am  with [facility]  [address] [address] [phone].    Before we get started may I verify your date of birth? 1975    I am calling to officially welcome you to our practice and ask about your recent visit. Is this a good time to talk? No- left voicemail to call back    Tell me about your visit with us. What things went well?         We're always looking for ways to make our patients' experiences even better. Do you have recommendations on ways we may improve?      Overall were you satisfied with your first visit to our practice?        I appreciate you taking the time to speak with me today. Is there anything else I can do for you?       Thank you, and have a great day.

## 2024-06-17 ENCOUNTER — HOSPITAL ENCOUNTER (OUTPATIENT)
Dept: MAMMOGRAPHY | Facility: HOSPITAL | Age: 49
Discharge: HOME | End: 2024-06-17
Payer: COMMERCIAL

## 2024-06-17 DIAGNOSIS — R92.8 ABNORMAL MAMMOGRAM: ICD-10-CM

## 2024-06-17 PROCEDURE — 25010000002 LIDOCAINE 1 % SOLUTION

## 2024-06-17 PROCEDURE — A4648 IMPLANTABLE TISSUE MARKER: HCPCS

## 2024-06-17 PROCEDURE — 88341 IMHCHEM/IMCYTCHM EA ADD ANTB: CPT | Performed by: NURSE PRACTITIONER

## 2024-06-17 PROCEDURE — 88342 IMHCHEM/IMCYTCHM 1ST ANTB: CPT | Performed by: NURSE PRACTITIONER

## 2024-06-17 PROCEDURE — 88305 TISSUE EXAM BY PATHOLOGIST: CPT | Performed by: NURSE PRACTITIONER

## 2024-06-17 RX ORDER — LIDOCAINE HYDROCHLORIDE AND EPINEPHRINE 10; 10 MG/ML; UG/ML
INJECTION, SOLUTION INFILTRATION; PERINEURAL
Status: COMPLETED
Start: 2024-06-17 | End: 2024-06-17

## 2024-06-17 RX ORDER — LIDOCAINE HYDROCHLORIDE 10 MG/ML
INJECTION, SOLUTION INFILTRATION; PERINEURAL
Status: COMPLETED
Start: 2024-06-17 | End: 2024-06-17

## 2024-06-17 RX ADMIN — LIDOCAINE HYDROCHLORIDE: 10 INJECTION, SOLUTION INFILTRATION; PERINEURAL at 11:42

## 2024-06-17 RX ADMIN — LIDOCAINE HYDROCHLORIDE,EPINEPHRINE BITARTRATE: 10; .01 INJECTION, SOLUTION INFILTRATION; PERINEURAL at 11:42

## 2024-06-18 ENCOUNTER — PATIENT OUTREACH (OUTPATIENT)
Dept: ONCOLOGY | Facility: HOSPITAL | Age: 49
End: 2024-06-18
Payer: COMMERCIAL

## 2024-06-18 LAB
CYTO UR: NORMAL
LAB AP CASE REPORT: NORMAL
LAB AP CLINICAL INFORMATION: NORMAL
LAB AP SPECIAL STAINS: NORMAL
PATH REPORT.FINAL DX SPEC: NORMAL
PATH REPORT.GROSS SPEC: NORMAL

## 2024-06-19 ENCOUNTER — TELEPHONE (OUTPATIENT)
Dept: SURGERY | Facility: CLINIC | Age: 49
End: 2024-06-19
Payer: COMMERCIAL

## 2024-06-19 NOTE — TELEPHONE ENCOUNTER
I did speak to pt and let her know that bx results came back benign and concordant. She knows Moris Mendoza is out this week I told her Moris or me will call her Monday with a plan of what to schedule next.  Pt stated understanding           Bertha Boss gave me the ok to call the pt and let her know that bx was benign and concordant

## 2024-06-24 ENCOUNTER — TELEPHONE (OUTPATIENT)
Dept: SURGERY | Facility: CLINIC | Age: 49
End: 2024-06-24
Payer: COMMERCIAL

## 2024-06-24 DIAGNOSIS — Z91.89 AT HIGH RISK FOR BREAST CANCER: Primary | ICD-10-CM

## 2024-06-24 NOTE — TELEPHONE ENCOUNTER
Called and left message with benign biopsy results.  Please set her up for an MRI and a follow-up visit with me in December.  Order has been placed

## 2024-06-24 NOTE — TELEPHONE ENCOUNTER
Martinm for pt to let her know I tim her fu breast MRI at [facility] on 12/09 @ 11am and then I have her scheduled to see moris griffin after on 12/16 @ 940am and if she has any questions to please give me a call back     Sent pt message via [employer] Morning   I wanted to let you know that SARAH Kurtz left you a message about your breast biopsy results. I scheduled your breast MRI at [facility] on 12/09 @ 11am and then I have you to scheduled to see Moris after on 12/16 @ 940.    If you have any questions or need anything please let me know   Thanks   Iram OSPINA

## 2024-08-06 DIAGNOSIS — N95.1 MENOPAUSAL SYMPTOMS: Primary | ICD-10-CM

## 2024-08-09 ENCOUNTER — LAB (OUTPATIENT)
Dept: OBSTETRICS AND GYNECOLOGY | Facility: CLINIC | Age: 49
End: 2024-08-09
Payer: COMMERCIAL

## 2024-08-09 DIAGNOSIS — N95.1 MENOPAUSAL SYMPTOMS: ICD-10-CM

## 2024-08-09 LAB
ALBUMIN SERPL-MCNC: 4.3 G/DL (ref 3.5–5.2)
ALBUMIN/GLOB SERPL: 1.3 G/DL
ALP SERPL-CCNC: 83 U/L (ref 39–117)
ALT SERPL W P-5'-P-CCNC: 15 U/L (ref 1–33)
ANION GAP SERPL CALCULATED.3IONS-SCNC: 10.8 MMOL/L (ref 5–15)
AST SERPL-CCNC: 22 U/L (ref 1–32)
BILIRUB SERPL-MCNC: 0.3 MG/DL (ref 0–1.2)
BUN SERPL-MCNC: 15 MG/DL (ref 6–20)
BUN/CREAT SERPL: 17.4 (ref 7–25)
CALCIUM SPEC-SCNC: 9.9 MG/DL (ref 8.6–10.5)
CHLORIDE SERPL-SCNC: 104 MMOL/L (ref 98–107)
CO2 SERPL-SCNC: 25.2 MMOL/L (ref 22–29)
CREAT SERPL-MCNC: 0.86 MG/DL (ref 0.57–1)
EGFRCR SERPLBLD CKD-EPI 2021: 81.9 ML/MIN/1.73
GLOBULIN UR ELPH-MCNC: 3.3 GM/DL
GLUCOSE SERPL-MCNC: 61 MG/DL (ref 65–99)
POTASSIUM SERPL-SCNC: 4.2 MMOL/L (ref 3.5–5.2)
PROT SERPL-MCNC: 7.6 G/DL (ref 6–8.5)
SODIUM SERPL-SCNC: 140 MMOL/L (ref 136–145)

## 2024-08-09 PROCEDURE — 80053 COMPREHEN METABOLIC PANEL: CPT | Performed by: OBSTETRICS & GYNECOLOGY

## 2024-10-17 ENCOUNTER — PATIENT MESSAGE (OUTPATIENT)
Dept: OBSTETRICS AND GYNECOLOGY | Facility: CLINIC | Age: 49
End: 2024-10-17
Payer: COMMERCIAL

## 2024-10-18 NOTE — TELEPHONE ENCOUNTER
We do have some samples, and a co-pay savings card that should reduce the cost to $30 per month since she has commercial insurance.

## 2024-10-22 NOTE — TELEPHONE ENCOUNTER
So it can go to her preferred pharmacy and I can give her the savings card when she picks up the samples. Or it can go to [facility], they are the specialty pharmacy that [facility] is partnered with and can handle the PA and mail the medicine to her. I will reach out to the patient and see what she would prefer.

## 2024-10-24 NOTE — TELEPHONE ENCOUNTER
Patient desires to have rx sent to [facility] specialty pharmacy. I have placed the order below and updated the pharmacy.

## 2024-12-09 ENCOUNTER — HOSPITAL ENCOUNTER (OUTPATIENT)
Dept: MRI IMAGING | Facility: HOSPITAL | Age: 49
Discharge: HOME | End: 2024-12-09
Admitting: NURSE PRACTITIONER
Payer: COMMERCIAL

## 2024-12-09 DIAGNOSIS — Z91.89 AT HIGH RISK FOR BREAST CANCER: ICD-10-CM

## 2024-12-09 PROCEDURE — 25510000002 GADOBENATE DIMEGLUMINE 529 MG/ML SOLUTION: Performed by: NURSE PRACTITIONER

## 2024-12-09 PROCEDURE — 77049 MRI BREAST C-+ W/CAD BI: CPT

## 2024-12-09 PROCEDURE — A9577 INJ MULTIHANCE: HCPCS | Performed by: NURSE PRACTITIONER

## 2024-12-09 RX ADMIN — GADOBENATE DIMEGLUMINE 15 ML: 529 INJECTION, SOLUTION INTRAVENOUS at 11:44

## 2024-12-12 NOTE — PROGRESS NOTES
[facility]     Referring Provider: Dr Jean     Chief complaint:  History of breast cancer and breast pain     HPI: Ms. Shruthi Gabriel is a 48yo woman, seen at the request of Magda Jean DO, for breast pain and history of right breast cancer    I personally reviewed her records and summarized her relevant breast history/imaging:    3/10/2021 bilateral screening mammogram at [facility]  Breasts are extremely dense.  BI-RADS 1    4/27/2021 bilateral breast MRI at [facility]  Right breast:  Moderate background parenchymal enhancement.  0.6 cm enhancing mass superior to the     nipple line in the far right lateral breast has early medium uptake and delayed washout kinetics     (image [id number]).  Signal characteristics do not definitively confirm lymph node.  No axillary     or internal mammary chain adenopathy.  No suspicious skin or nipple enhancement.    Left breast:  No suspicious mass or non mass enhancement stands out over the moderate background     parenchymal enhancement.  No morphologically suspicious lesions.  No axillary or internal mammary     chain adenopathy.  No suspicious nipple or skin lesions.    No suspicious chest wall or pectoralis enhancement.    IMPRESSION:    Right breast:  0.6 cm enhancing mass in the far lateral central right breast superior to the nipple     line.  Recommend second-look ultrasound.   Left breast:  No MRI evidence of malignancy.    BIRADS:    DIAGNOSTIC CATEGORY 0    5/13/2021 right Limited breast ultrasound at [facility]  Corresponding to the mass in the far outer, central right breast on MRI, there is a 6 x 4 x 5 mm     hypoechoic mass with indistinct margins at 09:30, 9 cm from the nipple.    CONCLUSION:        Recommend ultrasound-guided biopsy of right breast mass.      I discussed results with the patient following the exam.  She will be scheduled for biopsy, and our     staff will notify the ordering physician's  office.    RECOMMENDATION(S):      ULTRASOUND-GUIDED CORE BIOPSY: RIGHT BREAST      BIRADS:      DIAGNOSTIC CATEGORY 4    6/4/2021 ultrasound-guided breast biopsy at [facility]  Technically successful ultrasound guided core biopsy of an irregular 0.6 cm mass 9:30     hours 9 cm from the nipple of the right breast. The patient tolerated the procedure well without     immediate complication. Specimens have been sent to pathology for further analysis.  Addendum will     be dictated upon receiving final pathology for concordance and recommendations.    Pathology  Invasive carcinoma of no fascial type ductal  Histologic grade  Score 2  ER positive, WY positive, HER2 equivocal, Ki-67 20%, FISH positive    8/29/2022 bilateral diagnostic mammogram at [facility]  Breasts are heterogeneously dense.  Posttreatment change in the right breast.  No evidence of malignancy.  BI-RADS 2    3/15/2023 bilateral MRI of the breast at [facility]  Postsurgical change from prior segmental mastectomy in the lateral aspect of the right breast.  There is no morphologically or genetically suspicious finding within the right breast.  Right enhancing axillary lymph node appears morphologically normal with thin concentric cortices and preserved fat augusta.  No morphologically abnormal appearing lymph node is seen.  Left there is no morphologically or clinically suspicious findings of the left breast.  Impression no MRI evidence malignancy in either breast.  BI-RADS 2    9/11/2023 bilateral diagnostic mammogram at [facility]  Scattered areas of fibroglandular density  No evidence of malignancy.  BI-RADS 2    6/7/2024 bilateral diagnostic mammogram and bilateral limited breast ultrasound at [facility]  The mammogram has a stable appearance. No suspicious masses, areas of  architectural distortion or suspicious microcalcifications are  identified in either breast.  Focused ultrasound  examination of the 3 o'clock position of the left  breast 10 cm from the nipple in the area of pain described the patient  demonstrates normal parenchyma. No suspicious masses or areas of  abnormal shadowing are identified. Focused ultrasound examination of the  10 o'clock position of the right breast 9 cm from the nipple  demonstrates a 4 mm round hypoechoic mass.  The mammographic images were interpreted with the assistance of a  computer aided detection system.  The patient's information is entered into a computerized reminder system  with a targeted due date for the next mammogram.  IMPRESSION:  OVERALL IMPRESSION:  1. Right breast: 4 mm indeterminate mass 10 o'clock position 9 cm from  the nipple. Recommend ultrasound-guided biopsy.  2. Left breast: Benign mammogram and focused left breast ultrasound.  BI-RADS ASSESSMENT: BI-RADS 4    She has a personal history of 2021 right breast invasive ductal carcinoma, ER/FL positive HER2 equivocal FISH positive Ki-67 20%, intermittent grade ductal carcinoma in situ, lumpectomy with negative sentinel node biopsy, radiation, tamoxifen  x 2 years.  Stopped taking 8-2023 due to side effects.     She has a family history of breast cancer in her sister dx age 52. 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 She was due for MRI in March 2024 but she did not have it. She also has a history of right breast cancer.  She has been in high risk screening since the cancer diagnosis due to not finding the cancer by mammogram.   She is scheduled for a total hysterectomy tomorrow by Shoaib Kerns for increased bleeding and pain.   She denies any breast lumps, pain, skin changes, or nipple discharge.    12/16/2024 interval history  Patient  presenting to the office today for routine follow-up.  On 2024 she had an ultrasound-guided breast biopsy with benign pathology.  On 2024 she had a bilateral breast MRI that resulted as BI-RADS 1.  She has no new breast complaints or concerns today.  Had total hysterectomy in  and has been experiencing a lot of postmenopausal symptoms including hot flashes, lack of sleep, joint pain she is also worried about her heart disease now because of her mother's history and no longer having any estrogen.  She is questioning if she would be able to have estrogen therapy with her history of breast cancer.    Review of Systems - Oncology    Medications:    Current Outpatient Medications:     acetaminophen (Tylenol) 325 MG tablet, Take 2 tablets by mouth Every 4 (Four) Hours As Needed for Moderate Pain., Disp: 30 tablet, Rfl: 1    Fezolinetant (VEOZAH) 45 MG tablet, Take 1 tablet by mouth Daily., Disp: 90 tablet, Rfl: 3    ibuprofen (ADVIL,MOTRIN) 600 MG tablet, Take 1 tablet by mouth Every 6 (Six) Hours As Needed for Mild Pain., Disp: 30 tablet, Rfl: 1    Probiotic Product (PROBIOTIC DAILY PO), Take 1 tablet by mouth Daily., Disp: , Rfl:     Allergies:  No Known Allergies    Medical history:  Past Medical History:   Diagnosis Date    Abnormal uterine bleeding (AUB)     Breast cancer     remission    Family history of cardiac disorder     see's dr jacob. pt denied any cardiac hx just used md as preventive measures LAST SEEN , FOLLOWS UP PRN    Herpes     Menorrhagia     Palpitations     x1- 2 seen nidia, since family hx of cardiac problems. pt denied any problems at this time.    Ulcerative colitis        Surgical History:  Past Surgical History:   Procedure Laterality Date    BREAST BIOPSY Right     BREAST LUMPECTOMY Right     NO BP OR NEEDLE STICKS RIGHT SIDE     SECTION      COLONOSCOPY      COLONOSCOPY N/A 2022    Procedure: COLONOSCOPY SCREENING WITH INDIGO CARMINE FLUSH,  POLYPECTOMY;  Surgeon: Iggy Hoskins MD;  Location: [facility] ENDOSCOPY;  Service: Gastroenterology;  Laterality: N/A;  COLON POLYP    COLONOSCOPY N/A 2024    Procedure: COLONOSCOPY WITH METYLENE BLUE IRRIGATION;  Surgeon: Iggy Hoskins MD;  Location: [facility] ENDOSCOPY;  Service: Gastroenterology;  Laterality: N/A;  NORMAL    D & C HYSTEROSCOPY ENDOMETRIAL ABLATION N/A 2022    Procedure: DILATATION AND CURETTAGE HYSTEROSCOPY NOVASURE ENDOMETRIAL ABLATION;  Surgeon: Magda Jean DO;  Location: [facility] MAIN OR;  Service: Gynecology;  Laterality: N/A;    TOTAL LAPAROSCOPIC HYSTERECTOMY Bilateral 2024    Procedure: Diagnostic Laparoscopy;  Surgeon: Magda Jean DO;  Location: [facility] MAIN OR;  Service: Gynecology;  Laterality: Bilateral;       Family History:  Family History   Problem Relation Age of Onset    Heart failure Mother     Breast cancer Sister 52    Heart failure Maternal Grandfather     Lung cancer Maternal Uncle     Colon cancer Neg Hx     Malig Hyperthermia Neg Hx     Ovarian cancer Neg Hx     Uterine cancer Neg Hx     Deep vein thrombosis Neg Hx     Pulmonary embolism Neg Hx        Social History:   Social History     Socioeconomic History    Marital status:    Tobacco Use    Smoking status: Never     Passive exposure: Never    Smokeless tobacco: Never   Vaping Use    Vaping status: Never Used   Substance and Sexual Activity    Alcohol use: Not Currently    Drug use: Never    Sexual activity: Defer     Birth control/protection: Vasectomy     Patient drinks 1-2 servings of caffeine per day.       GYNECOLOGIC HISTORY:   . P: 1. AB: 0.  Last menstrual period: 2024  Age at menarche: 11  Age at first childbirth: 39  Lactation/How lon months   Age at menopause: N/A  Total years of oral contraceptive use: 15 years  Total years of hormone replacement therapy: N/A      Physical Exam  There were no vitals filed for this visit.    ECOG 0 -  Asymptomatic  General: NAD, well appearing  Psych: a&o x 3, normal mood and affect  Eyes: EOMI, no scleral icterus  ENMT: neck supple without masses or thyromegaly, mucus membranes moist  Resp: normal effort, CTAB  CV: RRR, no murmurs, no edema   GI: soft, NT, ND  MSK: normal gait, normal ROM in bilateral shoulders  Lymph nodes: no cervical, supraclavicular or axillary lymphadenopathy  Breast: symmetric, medium  Right: No visible abnormalities on inspection while seated, with arms raised or hands on hips. No masses, skin changes, or nipple abnormalities. 9:00 11cmfn 6cm scar from previous lumpectomy  Left: No visible abnormalities on inspection while seated, with arms raised or hands on hips. No masses, skin changes, or nipple abnormalities.    Imagin2024 right ultrasound-guided biopsy at [facility]  Final pathology results are available (below) and have been reviewed.  They are benign and concordant with the imaging findings. This is a  benign result. She may resume annual screening mammography.  Final Diagnosis  Right breast,  10 o'clock position, 9 cm from nipple, ultrasound-guided  core biopsy:               - Sclerosed duct ectasia    2024 bilateral breast MRI [facility]  mount of fibroglandular tissue: Extreme fibroglandular tissue.  Background parenchymal enhancement: Mild Symmetric.  No suspicious mass or non-mass enhancement in either breast. No abnormal  skin or nipple enhancement. No axillary or internal mammary chain  adenopathy. Signal void from biopsy clip within the right breast. No  suspicious enhancement of the visualized anterior thorax.  IMPRESSION:  No MRI evidence of malignancy. Recommend annual screening mammography  and annual high-risk breast MRI.  ASSESSMENT:  BI-RADS 1. Negative.      Assessment:    History of breast cancer  Breast pain  Abnormal breast imaging- resolved     Discussion:  Talked at length regarding estrogen therapy after having breast cancer.  I do believe that her quality  of life has been significantly decreased after her hysterectomy so I discussed with her using estrogen only at the lowest possible dose for no longer than 5 years to see if that helps with her quality of life.  She is going to discuss this with her OB/GYN      Plan:  We will continue her in high risk screening because cancer was not detected on mammogram  Mammogram in June followed by exam        SARAH Kurtz    I have spent 40 mins in face to face time with the patient and in chart review.    CC:  Magda Jean, Lluvia Britton APRN EMR Dragon/transcription disclaimer:  Dictated using Dragon dictation

## 2024-12-16 ENCOUNTER — OFFICE VISIT (OUTPATIENT)
Dept: SURGERY | Facility: CLINIC | Age: 49
End: 2024-12-16
Payer: COMMERCIAL

## 2024-12-16 VITALS
DIASTOLIC BLOOD PRESSURE: 82 MMHG | SYSTOLIC BLOOD PRESSURE: 124 MMHG | HEIGHT: 63 IN | HEART RATE: 75 BPM | BODY MASS INDEX: 25.34 KG/M2 | WEIGHT: 143 LBS | OXYGEN SATURATION: 96 %

## 2024-12-16 DIAGNOSIS — Z17.0 MALIGNANT NEOPLASM OF UPPER-OUTER QUADRANT OF RIGHT BREAST IN FEMALE, ESTROGEN RECEPTOR POSITIVE: Primary | ICD-10-CM

## 2024-12-16 DIAGNOSIS — C50.411 MALIGNANT NEOPLASM OF UPPER-OUTER QUADRANT OF RIGHT BREAST IN FEMALE, ESTROGEN RECEPTOR POSITIVE: Primary | ICD-10-CM

## 2024-12-16 DIAGNOSIS — Z12.31 ENCOUNTER FOR SCREENING MAMMOGRAM FOR MALIGNANT NEOPLASM OF BREAST: ICD-10-CM

## 2024-12-16 RX ORDER — MULTIPLE VITAMINS W/ MINERALS TAB 9MG-400MCG
1 TAB ORAL DAILY
COMMUNITY

## 2025-01-24 NOTE — PROGRESS NOTES
"GYN Visit    Chief Complaint   Patient presents with    HRT       HPI:   50 y.o. with hx breast cancer and s/p robotic laparoscopic hysterectomy with BSO due to endometriosis here to discuss HRT. Pt is having terrible hot flashes and night sweats and is miserable. She has seen the NP at the breast surgeons office to discuss her symptoms and possible use of estrogen. She currently using veozah with some improvement in her symptoms but the cost is too much. She has been taking samples that have been provided and is spacing out the intervals of use to make them last. Her quality of life is poor as a result.      History: PMHx, Meds, Allergies, PSHx, Social Hx, and POBHx all reviewed and updated.    PHYSICAL EXAM:  /73   Pulse 60   Ht 160 cm (62.99\")   Wt 66.2 kg (146 lb)   LMP 2024 Comment: Spotting, menses before was 2 months ago, light, lasting 3-4 days  Breastfeeding No   BMI 25.87 kg/m²   General- NAD, alert and oriented, appropriate  Psych- Normal mood, good memory        Office Visit with Moris Mendoza APRN (2024)       ASSESSMENT AND PLAN:  Diagnoses and all orders for this visit:    1. Menopausal symptoms (Primary)  -     Comprehensive Metabolic Panel  -     Fezolinetant (VEOZAH) 45 MG tablet; Take 1 tablet by mouth Daily.  Dispense: 28 tablet; Refill: 0  -     Conj Estrogens-Bazedoxifene (Duavee) 0.45-20 MG tablet; Take 1 tablet by mouth Daily.  Dispense: 90 tablet; Refill: 4    Notes from breast surgeon has been reviewed and appreciated. I will check LFT's as patient has been taking veozah. We discussed using ERT at lowest possible dosage to improve symptoms. I have spoken with another provider about options and we have discussed use of duavee  which is a selective estrogen receptor blocker with estrogen like effects on bone and lipids and antiestrogen effects on uterus and breast.         Follow Up:  No follow-ups on file.    I spent 45 minutes caring for Shruthi on this date " of service. This time includes time spent by me in the following activities:reviewing tests, obtaining and/or reviewing a separately obtained history, performing a medically appropriate examination and/or evaluation , counseling and educating the patient/family/caregiver, ordering medications, tests, or procedures, referring and communicating with other health care professionals , documenting information in the medical record, and independently interpreting results and communicating that information with the patient/family/caregiver      Magda Jean DO  01/29/2025    [facility]  [address] [place] [address]  Dept: [phone]  Dept Fax: [phone]  Loc: [phone]  Loc Fax: [phone]

## 2025-01-29 ENCOUNTER — OFFICE VISIT (OUTPATIENT)
Dept: OBSTETRICS AND GYNECOLOGY | Facility: CLINIC | Age: 50
End: 2025-01-29
Payer: COMMERCIAL

## 2025-01-29 VITALS
DIASTOLIC BLOOD PRESSURE: 73 MMHG | SYSTOLIC BLOOD PRESSURE: 113 MMHG | HEART RATE: 60 BPM | HEIGHT: 63 IN | WEIGHT: 146 LBS | BODY MASS INDEX: 25.87 KG/M2

## 2025-01-29 DIAGNOSIS — N95.1 MENOPAUSAL SYMPTOMS: Primary | ICD-10-CM

## 2025-01-29 LAB
ALBUMIN SERPL-MCNC: 4.4 G/DL (ref 3.5–5.2)
ALBUMIN/GLOB SERPL: 1.4 G/DL
ALP SERPL-CCNC: 102 U/L (ref 39–117)
ALT SERPL W P-5'-P-CCNC: 23 U/L (ref 1–33)
ANION GAP SERPL CALCULATED.3IONS-SCNC: 9 MMOL/L (ref 5–15)
AST SERPL-CCNC: 25 U/L (ref 1–32)
BILIRUB SERPL-MCNC: 0.4 MG/DL (ref 0–1.2)
BUN SERPL-MCNC: 12 MG/DL (ref 6–20)
BUN/CREAT SERPL: 13.6 (ref 7–25)
CALCIUM SPEC-SCNC: 10.1 MG/DL (ref 8.6–10.5)
CHLORIDE SERPL-SCNC: 104 MMOL/L (ref 98–107)
CO2 SERPL-SCNC: 26 MMOL/L (ref 22–29)
CREAT SERPL-MCNC: 0.88 MG/DL (ref 0.57–1)
EGFRCR SERPLBLD CKD-EPI 2021: 79.7 ML/MIN/1.73
GLOBULIN UR ELPH-MCNC: 3.1 GM/DL
GLUCOSE SERPL-MCNC: 83 MG/DL (ref 65–99)
POTASSIUM SERPL-SCNC: 4.4 MMOL/L (ref 3.5–5.2)
PROT SERPL-MCNC: 7.5 G/DL (ref 6–8.5)
SODIUM SERPL-SCNC: 139 MMOL/L (ref 136–145)

## 2025-01-29 PROCEDURE — 80053 COMPREHEN METABOLIC PANEL: CPT | Performed by: OBSTETRICS & GYNECOLOGY

## 2025-01-29 PROCEDURE — 99214 OFFICE O/P EST MOD 30 MIN: CPT | Performed by: OBSTETRICS & GYNECOLOGY

## 2025-01-31 ENCOUNTER — TELEPHONE (OUTPATIENT)
Dept: OBSTETRICS AND GYNECOLOGY | Facility: CLINIC | Age: 50
End: 2025-01-31
Payer: COMMERCIAL

## 2025-01-31 RX ORDER — CONJUGATED ESTROGENS/BAZEDOXIFENE .45; 2 MG/1; MG/1
1 TABLET, FILM COATED ORAL DAILY
Qty: 90 TABLET | Refills: 4 | Status: SHIPPED | OUTPATIENT
Start: 2025-01-31

## 2025-01-31 NOTE — TELEPHONE ENCOUNTER
Spoke with patient gave her your recommendations, she stated Yes she would like to try the Duavee.

## 2025-01-31 NOTE — TELEPHONE ENCOUNTER
----- Message from Magda Jean sent at 1/30/2025  6:03 PM EST -----  Notify patient that her LFTs are normal.  Notify patient that I have also spoken with Dr. Almanza and we would recommend a trial of Augustine if she desires to try this I will send this to her pharmacy just let me know.  This is something that is safe for patients with breast cancer.

## 2025-02-06 ENCOUNTER — TELEPHONE (OUTPATIENT)
Dept: OBSTETRICS AND GYNECOLOGY | Facility: CLINIC | Age: 50
End: 2025-02-06
Payer: COMMERCIAL

## 2025-02-06 NOTE — TELEPHONE ENCOUNTER
Contacted patient to let her know that we got the Duavee samples in for her. I have them at the  for her to  at her convenience. Also advised her that if she has someone else come to get them for her she would need to let us know who so we can verify that the correct person is picking them up.

## 2025-05-22 NOTE — PROGRESS NOTES
"Well Woman Visit    CC: Scheduled annual well gyn visit  Chief Complaint   Patient presents with    Annual Exam       HPI:   50 y.o.   Social History     Substance and Sexual Activity   Sexual Activity Defer    Birth control/protection: Vasectomy, Hysterectomy       50-year-old female G1, P1 status post robotic assisted total laparoscopic hysterectomy here for annual exam.  Had colonoscopy last year told needs follow-up in 1 year so needs appointment is for a repeat colonoscopy.  Has appointment for mammogram.  Using hormone replacement therapy desires to decrease estrogen.    Pt has no complaints today.    PCP: does manage PMHx and preventative labs  History: PMHx, Meds, Allergies, PSHx, Social Hx, and POBHx all reviewed and updated.    PHYSICAL EXAM:  /80   Pulse 69   Ht 160 cm (62.99\")   Wt 68.9 kg (152 lb)   LMP 2024 Comment: Spotting, menses before was 2 months ago, light, lasting 3-4 days  Breastfeeding No   BMI 26.93 kg/m²  Not found.  Chaperone present  Chaperone present during breast and/or pelvic exam if performed.  General- NAD, alert and oriented, appropriate  Psych- Normal mood, good memory  Neck- No masses, no thyroid enlargement  CV- Regular rhythm, no murmurs  Resp- CTA to bases, no wheezes  Abdomen- Soft, non distended, non tender, no masses    Breast left-  Bilaterally symmetrical, no masses, non tender, no nipple discharge, no axillary or supraclavicular nodes palpable.    Breast right- Bilaterally symmetrical, no masses, non tender, no nipple discharge, no axillary or supraclavicular nodes palpable.   Status post lumpectomy    External genitalia- Normal female, no lesions  Urethra/meatus- Normal, no masses, non tender  Bladder- Normal, no masses, non tender  Vagina- Normal, no atrophy, no lesions, no discharge.  Cuff is intact    Cvx- Absent  Uterus- Absent  Adnexa- No mass, non tender  Anus/Rectum/Perineum- Not performed    Lymphatic- No palpable neck, axillary, or groin " nodes  Ext- No edema, no cyanosis    Skin- No lesions, no rashes, no acanthosis nigricans      ASSESSMENT and PLAN:    Diagnoses and all orders for this visit:    1. Well woman exam with routine gynecological exam (Primary)    2. Menopausal symptoms  -     estradiol (Minivelle) 0.0375 MG/24HR patch; Place 1 patch on the skin as directed by provider 2 (Two) Times a Week.  Dispense: 24 patch; Refill: 4    3. Encounter for screening colonoscopy  -     Ambulatory Referral to Gastroenterology    4. Malignant neoplasm of upper-outer quadrant of right female breast, unspecified estrogen receptor status        Preventative:  BREAST HEALTH- Monthly self breast exam importance and how to reviewed. MMG and/or MRI (prn) reviewed per society guidelines and her individual history. Screen: Already up to date  CERVICAL CANCER Screening- Reviewed current ASCCP guidelines for screening w and wo cotest HPV, age specific.  Screen: Not medically needed  COLON CANCER Screening- Reviewed current medical society guidelines and options.  Screen:  Updated today  Follow up PCP/Specialist PMHx and/or Labs          She understands the importance of having any ordered tests to be performed in a timely fashion.  The risks of not performing them include, but are not limited to, advanced cancer stages, bone loss from osteoporosis and/or subsequent increase in morbidity and/or mortality.  She is encouraged to review her results online and/or contact or office if she has questions.     Follow Up:  Return in about 1 year (around 5/28/2026) for Annual physical.            Magda Jean DO  05/28/2025    [facility] [address] [place] [address]  Dept: [phone]  Dept Fax: [phone]  Loc: [phone]  Loc Fax: [phone]

## 2025-05-28 ENCOUNTER — OFFICE VISIT (OUTPATIENT)
Dept: OBSTETRICS AND GYNECOLOGY | Age: 50
End: 2025-05-28
Payer: COMMERCIAL

## 2025-05-28 VITALS
SYSTOLIC BLOOD PRESSURE: 119 MMHG | HEART RATE: 69 BPM | HEIGHT: 63 IN | DIASTOLIC BLOOD PRESSURE: 80 MMHG | BODY MASS INDEX: 26.93 KG/M2 | WEIGHT: 152 LBS

## 2025-05-28 DIAGNOSIS — Z12.11 ENCOUNTER FOR SCREENING COLONOSCOPY: ICD-10-CM

## 2025-05-28 DIAGNOSIS — N95.1 MENOPAUSAL SYMPTOMS: ICD-10-CM

## 2025-05-28 DIAGNOSIS — C50.411 MALIGNANT NEOPLASM OF UPPER-OUTER QUADRANT OF RIGHT FEMALE BREAST, UNSPECIFIED ESTROGEN RECEPTOR STATUS: ICD-10-CM

## 2025-05-28 DIAGNOSIS — Z01.419 WELL WOMAN EXAM WITH ROUTINE GYNECOLOGICAL EXAM: Primary | ICD-10-CM

## 2025-05-28 RX ORDER — ESTRADIOL 0.04 MG/D
1 PATCH, EXTENDED RELEASE TRANSDERMAL 2 TIMES WEEKLY
Qty: 24 PATCH | Refills: 4 | Status: SHIPPED | OUTPATIENT
Start: 2025-05-29

## 2025-05-28 RX ORDER — ESTRADIOL 0.05 MG/D
1 PATCH, EXTENDED RELEASE TRANSDERMAL
COMMUNITY
Start: 2025-05-12 | End: 2025-05-28 | Stop reason: DRUGHIGH

## 2025-05-28 RX ORDER — PROGESTERONE 100 MG/1
100 CAPSULE ORAL
COMMUNITY
Start: 2025-05-12

## (undated) DEVICE — THE SINGLE USE ETRAP – POLYP TRAP IS USED FOR SUCTION RETRIEVAL OF ENDOSCOPICALLY REMOVED POLYPS.: Brand: ETRAP

## (undated) DEVICE — SINGLE-USE BIOPSY FORCEPS: Brand: RADIAL JAW 4

## (undated) DEVICE — SOL IRRG H2O PL/BG 1000ML STRL

## (undated) DEVICE — TRY PREP SCRB VAG PVP

## (undated) DEVICE — 1000ML,PRESSURE INFUSER W/STOPCOCK: Brand: MEDLINE

## (undated) DEVICE — SLV SCD KN/LEN ADJ EXPRSS BLENDED MD 1P/U

## (undated) DEVICE — GLV SURG BIOGEL LTX PF 6 1/2

## (undated) DEVICE — GYN LAPAROSCOPY-LF: Brand: MEDLINE INDUSTRIES, INC.

## (undated) DEVICE — SOLIDIFIER LIQLOC PLS 1500CC BT

## (undated) DEVICE — PREP TRAY WITH CHG: Brand: MEDLINE INDUSTRIES, INC.

## (undated) DEVICE — TOTAL TRAY, 16FR 10ML SIL FOLEY, URN: Brand: MEDLINE

## (undated) DEVICE — ELECTROSURGICAL DEVICE HOLSTER;FOR USE WITH MAXIMUM PEAK VOLTAGE OF 4000 V: Brand: FORCE TRIVERSE

## (undated) DEVICE — GOWN,REINFORCE,POLY,SIRUS,BREATH SLV,XLG: Brand: MEDLINE

## (undated) DEVICE — LAPAROSCOPIC SMOKE EVACUATION SYSTEM ACTIVE AND PASSIVE: Brand: VALLEYLAB

## (undated) DEVICE — Device: Brand: DEFENDO AIR/WATER/SUCTION AND BIOPSY VALVE

## (undated) DEVICE — LAPAROSCOPIC DISSECTOR: Brand: DEROYAL

## (undated) DEVICE — 40585 XL ADVANCED TRENDELENBURG POSITIONING KIT: Brand: 40585 XL ADVANCED TRENDELENBURG POSITIONING KIT

## (undated) DEVICE — TROCAR: Brand: KII OPTICAL ACCESS SYSTEM

## (undated) DEVICE — Device

## (undated) DEVICE — MEDI-VAC NON-CONDUCTIVE SUCTION TUBING: Brand: CARDINAL HEALTH

## (undated) DEVICE — MARYLAND JAW LAPAROSCOPIC SEALER/DIVIDER COATED: Brand: LIGASURE

## (undated) DEVICE — TOWEL,OR,DSP,ST,BLUE,STD,4/PK,20PK/CS: Brand: MEDLINE

## (undated) DEVICE — PROB ABL ENDOMTRL NOVASURE/G4 W/SURESND

## (undated) DEVICE — LITHOTOMY-SLINGS: Brand: MEDLINE INDUSTRIES, INC.

## (undated) DEVICE — SOL IRRG H2O BG 3000ML STRL

## (undated) DEVICE — CATH URETH INTRMIT ALLPURP LTX 16F RED

## (undated) DEVICE — ADHS SKIN SURG TISS VISC PREMIERPRO EXOFIN HI/VISC FAST/DRY

## (undated) DEVICE — NDL HYPO PRECISIONGLIDE REG 22G 1 1/2

## (undated) DEVICE — SNAR E/S POLYP SNAREMASTER OVL/10MM 2.8X2300MM YEL

## (undated) DEVICE — VAGINAL PREP TRAY: Brand: MEDLINE INDUSTRIES, INC.

## (undated) DEVICE — SOL NACL 0.9PCT 1000ML

## (undated) DEVICE — SUPER ATRAUMATIC GRABBER TIP, DISPOSABLE: Brand: RENEW

## (undated) DEVICE — GLV SURG BIOGEL LTX PF 7

## (undated) DEVICE — LAPAROSCOPIC WIRE J-HOOK ELECTRODE COATED: Brand: CLEANCOAT

## (undated) DEVICE — COLON KIT: Brand: MEDLINE INDUSTRIES, INC.